# Patient Record
Sex: MALE | Race: WHITE | NOT HISPANIC OR LATINO | URBAN - METROPOLITAN AREA
[De-identification: names, ages, dates, MRNs, and addresses within clinical notes are randomized per-mention and may not be internally consistent; named-entity substitution may affect disease eponyms.]

---

## 2023-11-24 ENCOUNTER — OFFICE VISIT (OUTPATIENT)
Dept: URGENT CARE | Facility: CLINIC | Age: 63
End: 2023-11-24
Payer: COMMERCIAL

## 2023-11-24 VITALS
SYSTOLIC BLOOD PRESSURE: 128 MMHG | TEMPERATURE: 98.2 F | RESPIRATION RATE: 18 BRPM | DIASTOLIC BLOOD PRESSURE: 76 MMHG | HEART RATE: 71 BPM | OXYGEN SATURATION: 97 %

## 2023-11-24 DIAGNOSIS — H65.193 ACUTE MEE (MIDDLE EAR EFFUSION), BILATERAL: ICD-10-CM

## 2023-11-24 DIAGNOSIS — R42 DIZZINESS: ICD-10-CM

## 2023-11-24 DIAGNOSIS — R42 VERTIGO: Primary | ICD-10-CM

## 2023-11-24 DIAGNOSIS — K02.9 TOOTH DECAY: ICD-10-CM

## 2023-11-24 DIAGNOSIS — Z75.8 DOES NOT HAVE PRIMARY CARE PROVIDER: ICD-10-CM

## 2023-11-24 PROCEDURE — 99213 OFFICE O/P EST LOW 20 MIN: CPT | Performed by: FAMILY MEDICINE

## 2023-11-24 RX ORDER — MECLIZINE HYDROCHLORIDE 25 MG/1
25 TABLET ORAL 3 TIMES DAILY PRN
Qty: 30 TABLET | Refills: 0 | Status: SHIPPED | OUTPATIENT
Start: 2023-11-24

## 2023-11-24 RX ORDER — FLUTICASONE PROPIONATE 50 MCG
2 SPRAY, SUSPENSION (ML) NASAL DAILY
Qty: 9 ML | Refills: 0 | Status: SHIPPED | OUTPATIENT
Start: 2023-11-24

## 2023-11-24 RX ORDER — PREDNISONE 10 MG/1
TABLET ORAL
Qty: 24 TABLET | Refills: 0 | Status: SHIPPED | OUTPATIENT
Start: 2023-11-24

## 2023-11-24 NOTE — PROGRESS NOTES
[facility]        NAME: Jolanta Armendariz is a 59 y.o. male  : 1960    MRN: [id number]  DATE: 2023  TIME: 12:47 PM    Assessment and Plan   Vertigo [R42]  1. Vertigo  meclizine (ANTIVERT) 25 mg tablet      2. Acute MAIKOL (middle ear effusion), bilateral  predniSONE 10 mg tablet    fluticasone (FLONASE) 50 mcg/act nasal spray    Ambulatory Referral to [facility]      3. Dizziness  predniSONE 10 mg tablet    fluticasone (FLONASE) 50 mcg/act nasal spray    Ambulatory Referral to Family Practice    meclizine (ANTIVERT) 25 mg tablet      4. Tooth decay  Ambulatory Referral to Dentistry    Ambulatory Referral to [facility]      5. Does not have primary care provider  Ambulatory Referral to [facility]            Patient Instructions       Follow up with PCP in 3-5 days. Proceed to  ER if symptoms worsen. You have fluid in both of your ears - this is most likley why you are dizzy and feel like your ears are full. You have been prescribed prednisone which will help the inflammation. You are to use the nasal spray which will also help the congestion and inflammation  You are to call the dentist for the decayed tooth  You have been given a dental referral for this. Call a family provider for follow up health care - you have been given a referral for this as well   Go to the ED if symptoms worsen       Chief Complaint     Chief Complaint   Patient presents with    Ear Fullness     C/o "blocked ears to both sides" onset "two weeks", pt denies any other symptoms. Denies any recent cold symptoms. Pt reports "I've been putting Debrox drops in but its not helping". Pt reports intermittent dizziness when changing position, "because my ears are blocked". Denies PCP contact, states "I don't have one". History of Present Illness       This is a 59year old male who states that for the last 2 weeks has had ear fullness  x 2 weeks.  He was concerned he may have had ear wax so he has been using debrox. He states that it isn't helping with the dizziness. He states that he has noted some vomiting with the dizziness as well. He denies having a PCP. States dizziness also seems to be worse with changing positions. He denies recent cold symptoms. States he has a broken decayed tooth as well and has no dentist.           Review of Systems   Review of Systems   Constitutional: Negative. HENT:  Positive for congestion, dental problem and ear pain. Eyes:  Positive for discharge. Respiratory: Negative. Cardiovascular: Negative. Gastrointestinal:  Positive for nausea and vomiting. Endocrine: Negative. Genitourinary: Negative. Musculoskeletal: Negative. Skin: Negative. Allergic/Immunologic: Negative. Neurological:  Positive for dizziness. Hematological: Negative. Psychiatric/Behavioral: Negative. Current Medications       Current Outpatient Medications:     fluticasone (FLONASE) 50 mcg/act nasal spray, 2 sprays into each nostril daily, Disp: 9 mL, Rfl: 0    meclizine (ANTIVERT) 25 mg tablet, Take 1 tablet (25 mg total) by mouth 3 (three) times a day as needed for dizziness or nausea, Disp: 30 tablet, Rfl: 0    predniSONE 10 mg tablet, Take 5 tabs po x 2 days; 4 tabs po x 2 days; 3 tabs po x 1 day; 2 tabs po x 1 day. 1 tab po x 1 day., Disp: 24 tablet, Rfl: 0    Current Allergies     Allergies as of 11/24/2023    (No Known Allergies)            The following portions of the patient's history were reviewed and updated as appropriate: allergies, current medications, past family history, past medical history, past social history, past surgical history and problem list.     History reviewed. No pertinent past medical history. History reviewed. No pertinent surgical history. History reviewed. No pertinent family history. Medications have been verified.         Objective   /76 (BP Location: Right arm, Patient Position: Sitting)   Pulse 71   Temp 98.2 °F (36.8 °C) (Temporal)   Resp 18   SpO2 97%   No LMP for male patient. Physical Exam     Physical Exam  Vitals and nursing note reviewed. Constitutional:       General: He is not in acute distress. Appearance: Normal appearance. He is obese. He is not ill-appearing, toxic-appearing or diaphoretic. HENT:      Head: Normocephalic and atraumatic. Ears:      Comments: B/L TM with fluid. No redness      Nose: Congestion present. No rhinorrhea. Mouth/Throat:      Mouth: Mucous membranes are moist.      Pharynx: Oropharynx is clear. No oropharyngeal exudate or posterior oropharyngeal erythema. Eyes:      General: Lids are normal. Vision grossly intact. Gaze aligned appropriately. Extraocular Movements: Extraocular movements intact. Right eye: Normal extraocular motion and no nystagmus. Left eye: Normal extraocular motion and no nystagmus. Pupils: Pupils are equal, round, and reactive to light. Cardiovascular:      Rate and Rhythm: Normal rate and regular rhythm. Pulses: Normal pulses. Heart sounds: Normal heart sounds. Pulmonary:      Effort: Pulmonary effort is normal. No respiratory distress. Breath sounds: Normal breath sounds. No stridor. No wheezing, rhonchi or rales. Chest:      Chest wall: No tenderness. Musculoskeletal:         General: Normal range of motion. Cervical back: Normal range of motion and neck supple. Skin:     General: Skin is warm and dry. Capillary Refill: Capillary refill takes less than 2 seconds. Neurological:      General: No focal deficit present. Mental Status: He is alert and oriented to person, place, and time. GCS: GCS eye subscore is 4. GCS verbal subscore is 5. GCS motor subscore is 6. Cranial Nerves: Cranial nerves 2-12 are intact. Sensory: Sensation is intact. Motor: Motor function is intact. Coordination: Coordination is intact. Gait: Gait is intact. Psychiatric:         Mood and Affect: Mood normal.         Behavior: Behavior normal.         Thought Content:  Thought content normal.         Judgment: Judgment normal.

## 2023-11-24 NOTE — PATIENT INSTRUCTIONS
You have fluid in both of your ears - this is most likley why you are dizzy and feel like your ears are full. You have been prescribed prednisone which will help the inflammation. You are to use the nasal spray which will also help the congestion and inflammation  You are to call the dentist for the decayed tooth  You have been given a dental referral for this.   Call a family provider for follow up health care - you have been given a referral for this as well   Go to the ED if symptoms worsen

## 2024-03-20 ENCOUNTER — OFFICE VISIT (OUTPATIENT)
Dept: FAMILY MEDICINE CLINIC | Facility: CLINIC | Age: 64
End: 2024-03-20
Payer: COMMERCIAL

## 2024-03-20 VITALS
OXYGEN SATURATION: 95 % | BODY MASS INDEX: 39.17 KG/M2 | SYSTOLIC BLOOD PRESSURE: 128 MMHG | TEMPERATURE: 98.4 F | HEART RATE: 71 BPM | HEIGHT: 75 IN | DIASTOLIC BLOOD PRESSURE: 86 MMHG | WEIGHT: 315 LBS

## 2024-03-20 DIAGNOSIS — J30.89 ENVIRONMENTAL AND SEASONAL ALLERGIES: ICD-10-CM

## 2024-03-20 DIAGNOSIS — R42 DIZZINESS: ICD-10-CM

## 2024-03-20 DIAGNOSIS — R09.82 PND (POST-NASAL DRIP): ICD-10-CM

## 2024-03-20 DIAGNOSIS — Z23 ENCOUNTER FOR IMMUNIZATION: ICD-10-CM

## 2024-03-20 DIAGNOSIS — Z12.11 SCREENING FOR COLORECTAL CANCER: ICD-10-CM

## 2024-03-20 DIAGNOSIS — Z13.31 DEPRESSION SCREENING NEGATIVE: ICD-10-CM

## 2024-03-20 DIAGNOSIS — Z11.59 NEED FOR HEPATITIS C SCREENING TEST: ICD-10-CM

## 2024-03-20 DIAGNOSIS — Z12.5 SCREENING FOR MALIGNANT NEOPLASM OF PROSTATE: ICD-10-CM

## 2024-03-20 DIAGNOSIS — H65.193 ACUTE MEE (MIDDLE EAR EFFUSION), BILATERAL: ICD-10-CM

## 2024-03-20 DIAGNOSIS — Z13.29 SCREENING FOR THYROID DISORDER: ICD-10-CM

## 2024-03-20 DIAGNOSIS — Z01.10 ENCOUNTER FOR HEARING EXAMINATION, UNSPECIFIED WHETHER ABNORMAL FINDINGS: ICD-10-CM

## 2024-03-20 DIAGNOSIS — Z13.0 SCREENING FOR DEFICIENCY ANEMIA: ICD-10-CM

## 2024-03-20 DIAGNOSIS — Z12.12 SCREENING FOR COLORECTAL CANCER: ICD-10-CM

## 2024-03-20 DIAGNOSIS — Z13.220 SCREENING FOR LIPID DISORDERS: ICD-10-CM

## 2024-03-20 DIAGNOSIS — Z76.89 ENCOUNTER TO ESTABLISH CARE: Primary | ICD-10-CM

## 2024-03-20 DIAGNOSIS — Z13.1 SCREENING FOR DIABETES MELLITUS: ICD-10-CM

## 2024-03-20 DIAGNOSIS — E66.01 CLASS 3 SEVERE OBESITY WITHOUT SERIOUS COMORBIDITY WITH BODY MASS INDEX (BMI) OF 40.0 TO 44.9 IN ADULT, UNSPECIFIED OBESITY TYPE (HCC): ICD-10-CM

## 2024-03-20 PROBLEM — E66.813 CLASS 3 SEVERE OBESITY WITHOUT SERIOUS COMORBIDITY WITH BODY MASS INDEX (BMI) OF 40.0 TO 44.9 IN ADULT (HCC): Status: ACTIVE | Noted: 2024-03-20

## 2024-03-20 PROCEDURE — 99204 OFFICE O/P NEW MOD 45 MIN: CPT | Performed by: NURSE PRACTITIONER

## 2024-03-20 RX ORDER — FLUTICASONE PROPIONATE 50 MCG
2 SPRAY, SUSPENSION (ML) NASAL DAILY
Qty: 9 ML | Refills: 0 | Status: SHIPPED | OUTPATIENT
Start: 2024-03-20

## 2024-03-20 NOTE — PROGRESS NOTES
Assessment/Plan:    Problem List Items Addressed This Visit     Class 3 severe obesity without serious comorbidity with body mass index (BMI) of 40.0 to 44.9 in adult (HCC)   Other Visit Diagnoses     Encounter to establish care    -  Primary    Encounter for immunization        Screening for thyroid disorder        Relevant Orders    TSH, 3rd generation with Free T4 reflex    Screening for deficiency anemia        Relevant Orders    CBC and differential    Screening for diabetes mellitus        Relevant Orders    Comprehensive metabolic panel    Screening for lipid disorders        Relevant Orders    Lipid Panel with Direct LDL reflex    Screening for malignant neoplasm of prostate        Relevant Orders    PSA, Total Screen    Depression screening negative        Need for hepatitis C screening test        Relevant Orders    Hepatitis C Antibody    Screening for colorectal cancer        Relevant Orders    Cologuard    Environmental and seasonal allergies        Relevant Orders    [facility] Allergy Panel, Adult    Encounter for hearing examination, unspecified whether abnormal findings        Relevant Orders    Ambulatory Referral to Audiology    Acute MAIKOL (middle ear effusion), bilateral        Relevant Medications    fluticasone (FLONASE) 50 mcg/act nasal spray    Dizziness        Relevant Medications    fluticasone (FLONASE) 50 mcg/act nasal spray    PND (post-nasal drip)        Relevant Medications    Misc Natural Product Nasal (Nasal Cleanse Rinse Mix) PACK           Diagnoses and all orders for this visit:    Encounter to establish care    Encounter for immunization    Screening for thyroid disorder  -     TSH, 3rd generation with Free T4 reflex; Future    Screening for deficiency anemia  -     CBC and differential; Future    Screening for diabetes mellitus  -     Comprehensive metabolic panel; Future    Screening for lipid disorders  -     Lipid Panel with Direct LDL reflex; Future    Screening for malignant  neoplasm of prostate  -     PSA, Total Screen; Future    Class 3 severe obesity without serious comorbidity with body mass index (BMI) of 40.0 to 44.9 in adult, unspecified obesity type (HCC)    Depression screening negative    Need for hepatitis C screening test  -     Hepatitis C Antibody; Future    Screening for colorectal cancer  -     Cologuard    Environmental and seasonal allergies  -     [facility] Allergy Panel, Adult; Future    Encounter for hearing examination, unspecified whether abnormal findings  -     Ambulatory Referral to Audiology; Future    Acute MAIKOL (middle ear effusion), bilateral  -     fluticasone (FLONASE) 50 mcg/act nasal spray; 2 sprays into each nostril daily    Dizziness  -     fluticasone (FLONASE) 50 mcg/act nasal spray; 2 sprays into each nostril daily    PND (post-nasal drip)  -     Misc Natural Product Nasal (Nasal Cleanse Rinse Mix) PACK; 1 Container into each nostril 3 (three) times a day as needed (nasal congestion)        No problem-specific Assessment & Plan notes found for this encounter.        Subjective:      Patient ID: Minor Pearson is a 63 y.o. male.    Patient presents with:  Establish Care: Pt states he is been having issues with both ears, his right ear is worse; he can barely hear anything. He wants to do the cologuard.     NP with no significant PMHx presents to establish care.     Pt seen in urgent care and November 2023 for upper respiratory and ear symptoms.  Patient was diagnosed with otitis media effusion bilaterally with vertigo and was treated conservatively with steroids and Antivert.  Patient states that majority of his symptoms and vertigo have improved however he still feels as though he continues to be unable to hear from the right ear.  States last audiology examination was in his second decade of life.  On exam bilateral canals and TMs are unremarkable.  OP exam showed scant white postnasal drip recommending nasal rinse kit and renew Flonase.  Patient  "is due for routine serology and will follow-up in 3 to 4 weeks for AWE.         The following portions of the patient's history were reviewed and updated as appropriate:   He has no past medical history on file.,  does not have any pertinent problems on file.,   has no past surgical history on file.,  family history includes Alzheimer's disease in his mother; Diabetes in his father.,   reports that he has never smoked. He has never used smokeless tobacco. He reports current alcohol use of about 2.0 standard drinks of alcohol per week. He reports that he does not currently use drugs.,  has No Known Allergies..  Current Outpatient Medications   Medication Sig Dispense Refill   • fluticasone (FLONASE) 50 mcg/act nasal spray 2 sprays into each nostril daily 9 mL 0   • Misc Natural Product Nasal (Nasal Cleanse Rinse Mix) PACK 1 Container into each nostril 3 (three) times a day as needed (nasal congestion) 1 each 0   • meclizine (ANTIVERT) 25 mg tablet Take 1 tablet (25 mg total) by mouth 3 (three) times a day as needed for dizziness or nausea (Patient not taking: Reported on 3/20/2024) 30 tablet 0   • predniSONE 10 mg tablet Take 5 tabs po x 2 days; 4 tabs po x 2 days; 3 tabs po x 1 day; 2 tabs po x 1 day. 1 tab po x 1 day. (Patient not taking: Reported on 3/20/2024) 24 tablet 0     No current facility-administered medications for this visit.       Depression Screening and Follow-up Plan: Patient was screened for depression during today's encounter. They screened negative with a PHQ-2 score of 0.          Review of Systems   All other systems reviewed and are negative.        Objective:  Vitals:    03/20/24 0803   BP: 128/86   Pulse: 71   Temp: 98.4 °F (36.9 °C)   TempSrc: Tympanic   SpO2: 95%   Weight: (!) 144 kg (317 lb 3.2 oz)   Height: 6' 2.5\" (1.892 m)     Body mass index is 40.18 kg/m².     Physical Exam  Vitals and nursing note reviewed.   Constitutional:       Appearance: Normal appearance. He is well-developed. "   HENT:      Head: Normocephalic and atraumatic.      Right Ear: Tympanic membrane, ear canal and external ear normal.      Left Ear: Tympanic membrane, ear canal and external ear normal.      Nose: Nose normal.      Right Sinus: No maxillary sinus tenderness or frontal sinus tenderness.      Left Sinus: No maxillary sinus tenderness or frontal sinus tenderness.      Mouth/Throat:      Mouth: Mucous membranes are moist.      Pharynx: Uvula midline.     Eyes:      General: Lids are normal.      Conjunctiva/sclera: Conjunctivae normal.      Pupils: Pupils are equal, round, and reactive to light.   Neck:      Thyroid: No thyroid mass.      Vascular: No JVD.      Trachea: Trachea and phonation normal.   Cardiovascular:      Rate and Rhythm: Normal rate and regular rhythm.      Pulses: Normal pulses.      Heart sounds: Normal heart sounds, S1 normal and S2 normal. No murmur heard.     No friction rub. No gallop.   Pulmonary:      Effort: Pulmonary effort is normal.      Breath sounds: Normal breath sounds.   Abdominal:      General: Bowel sounds are normal.      Palpations: Abdomen is soft.      Tenderness: There is no abdominal tenderness.   Genitourinary:     Comments: Deferred  Musculoskeletal:         General: Normal range of motion.      Cervical back: Full passive range of motion without pain, normal range of motion and neck supple.      Right lower leg: No edema.      Left lower leg: No edema.   Lymphadenopathy:      Head:      Right side of head: No submental, submandibular, tonsillar, preauricular, posterior auricular or occipital adenopathy.      Left side of head: No submental, submandibular, tonsillar, preauricular, posterior auricular or occipital adenopathy.      Cervical: No cervical adenopathy.   Skin:     General: Skin is warm and dry.      Capillary Refill: Capillary refill takes less than 2 seconds.   Neurological:      General: No focal deficit present.      Mental Status: He is alert and oriented to  "person, place, and time.      Sensory: Sensation is intact.      Motor: Motor function is intact.      Coordination: Coordination is intact.      Gait: Gait is intact.   Psychiatric:         Attention and Perception: Attention and perception normal.         Mood and Affect: Mood and affect normal.         Speech: Speech normal.         Behavior: Behavior normal. Behavior is cooperative.         Thought Content: Thought content normal.         Cognition and Memory: Cognition normal.         Judgment: Judgment normal.           Portions of the record may have been created with voice recognition software. Occasional wrong word or \"sound a like\" substitutions may have occurred due to the inherent limitations of voice recognition software. Read the chart carefully and recognize, using context, where substitutions have occurred. Contact me with any questions.  "

## 2024-03-21 ENCOUNTER — TELEPHONE (OUTPATIENT)
Dept: FAMILY MEDICINE CLINIC | Facility: CLINIC | Age: 64
End: 2024-03-21

## 2024-03-21 ENCOUNTER — APPOINTMENT (OUTPATIENT)
Dept: LAB | Facility: CLINIC | Age: 64
End: 2024-03-21
Payer: COMMERCIAL

## 2024-03-21 DIAGNOSIS — Z11.59 NEED FOR HEPATITIS C SCREENING TEST: ICD-10-CM

## 2024-03-21 DIAGNOSIS — Z13.29 SCREENING FOR THYROID DISORDER: ICD-10-CM

## 2024-03-21 DIAGNOSIS — Z13.0 SCREENING FOR DEFICIENCY ANEMIA: ICD-10-CM

## 2024-03-21 DIAGNOSIS — Z13.1 SCREENING FOR DIABETES MELLITUS: ICD-10-CM

## 2024-03-21 DIAGNOSIS — Z12.5 SCREENING FOR MALIGNANT NEOPLASM OF PROSTATE: ICD-10-CM

## 2024-03-21 DIAGNOSIS — J30.89 ENVIRONMENTAL AND SEASONAL ALLERGIES: ICD-10-CM

## 2024-03-21 DIAGNOSIS — Z13.220 SCREENING FOR LIPID DISORDERS: ICD-10-CM

## 2024-03-21 LAB
ALBUMIN SERPL BCP-MCNC: 4.5 G/DL (ref 3.5–5)
ALP SERPL-CCNC: 122 U/L (ref 34–104)
ALT SERPL W P-5'-P-CCNC: 128 U/L (ref 7–52)
ANION GAP SERPL CALCULATED.3IONS-SCNC: 12 MMOL/L (ref 4–13)
AST SERPL W P-5'-P-CCNC: 61 U/L (ref 13–39)
BASOPHILS # BLD AUTO: 0.04 THOUSANDS/ÂΜL (ref 0–0.1)
BASOPHILS NFR BLD AUTO: 1 % (ref 0–1)
BILIRUB SERPL-MCNC: 0.82 MG/DL (ref 0.2–1)
BUN SERPL-MCNC: 11 MG/DL (ref 5–25)
CALCIUM SERPL-MCNC: 9.4 MG/DL (ref 8.4–10.2)
CHLORIDE SERPL-SCNC: 100 MMOL/L (ref 96–108)
CHOLEST SERPL-MCNC: 167 MG/DL
CO2 SERPL-SCNC: 28 MMOL/L (ref 21–32)
CREAT SERPL-MCNC: 0.78 MG/DL (ref 0.6–1.3)
EOSINOPHIL # BLD AUTO: 0.24 THOUSAND/ÂΜL (ref 0–0.61)
EOSINOPHIL NFR BLD AUTO: 3 % (ref 0–6)
ERYTHROCYTE [DISTWIDTH] IN BLOOD BY AUTOMATED COUNT: 12.4 % (ref 11.6–15.1)
GFR SERPL CREATININE-BSD FRML MDRD: 96 ML/MIN/1.73SQ M
GLUCOSE P FAST SERPL-MCNC: 82 MG/DL (ref 65–99)
HCT VFR BLD AUTO: 53.6 % (ref 36.5–49.3)
HCV AB SER QL: NORMAL
HDLC SERPL-MCNC: 55 MG/DL
HGB BLD-MCNC: 17.6 G/DL (ref 12–17)
IMM GRANULOCYTES # BLD AUTO: 0.05 THOUSAND/UL (ref 0–0.2)
IMM GRANULOCYTES NFR BLD AUTO: 1 % (ref 0–2)
LDLC SERPL CALC-MCNC: 97 MG/DL (ref 0–100)
LYMPHOCYTES # BLD AUTO: 2.02 THOUSANDS/ÂΜL (ref 0.6–4.47)
LYMPHOCYTES NFR BLD AUTO: 26 % (ref 14–44)
MCH RBC QN AUTO: 29.9 PG (ref 26.8–34.3)
MCHC RBC AUTO-ENTMCNC: 32.8 G/DL (ref 31.4–37.4)
MCV RBC AUTO: 91 FL (ref 82–98)
MONOCYTES # BLD AUTO: 0.74 THOUSAND/ÂΜL (ref 0.17–1.22)
MONOCYTES NFR BLD AUTO: 10 % (ref 4–12)
NEUTROPHILS # BLD AUTO: 4.67 THOUSANDS/ÂΜL (ref 1.85–7.62)
NEUTS SEG NFR BLD AUTO: 59 % (ref 43–75)
NRBC BLD AUTO-RTO: 0 /100 WBCS
PLATELET # BLD AUTO: 223 THOUSANDS/UL (ref 149–390)
PMV BLD AUTO: 10.9 FL (ref 8.9–12.7)
POTASSIUM SERPL-SCNC: 4.4 MMOL/L (ref 3.5–5.3)
PROT SERPL-MCNC: 7.6 G/DL (ref 6.4–8.4)
PSA SERPL-MCNC: 0.72 NG/ML (ref 0–4)
RBC # BLD AUTO: 5.88 MILLION/UL (ref 3.88–5.62)
SODIUM SERPL-SCNC: 140 MMOL/L (ref 135–147)
TRIGL SERPL-MCNC: 74 MG/DL
TSH SERPL DL<=0.05 MIU/L-ACNC: 1.42 UIU/ML (ref 0.45–4.5)
WBC # BLD AUTO: 7.76 THOUSAND/UL (ref 4.31–10.16)

## 2024-03-21 PROCEDURE — 85025 COMPLETE CBC W/AUTO DIFF WBC: CPT

## 2024-03-21 PROCEDURE — 80053 COMPREHEN METABOLIC PANEL: CPT

## 2024-03-21 PROCEDURE — 82785 ASSAY OF IGE: CPT

## 2024-03-21 PROCEDURE — 86003 ALLG SPEC IGE CRUDE XTRC EA: CPT

## 2024-03-21 PROCEDURE — 86803 HEPATITIS C AB TEST: CPT

## 2024-03-21 PROCEDURE — 80061 LIPID PANEL: CPT

## 2024-03-21 PROCEDURE — 36415 COLL VENOUS BLD VENIPUNCTURE: CPT

## 2024-03-21 PROCEDURE — G0103 PSA SCREENING: HCPCS

## 2024-03-21 PROCEDURE — 84443 ASSAY THYROID STIM HORMONE: CPT

## 2024-03-21 NOTE — TELEPHONE ENCOUNTER
He wanted to know if you could add lyme test to his BW that he had done today. Jonathan took an extra vial of blood just in case. He has had ticks on him and lives in the [place] he just wanted to be tested

## 2024-03-23 LAB
A ALTERNATA IGE QN: <0.1 KUA/I
A FUMIGATUS IGE QN: <0.1 KUA/I
BERMUDA GRASS IGE QN: 0.13 KUA/I
BOXELDER IGE QN: <0.1 KUA/I
C HERBARUM IGE QN: <0.1 KUA/I
CAT DANDER IGE QN: 0.1 KUA/I
CMN PIGWEED IGE QN: <0.1 KUA/I
COMMON RAGWEED IGE QN: <0.1 KUA/I
COTTONWOOD IGE QN: <0.1 KUA/I
D FARINAE IGE QN: 0.14 KUA/I
D PTERONYSS IGE QN: 0.23 KUA/I
DOG DANDER IGE QN: 0.17 KUA/I
LONDON PLANE IGE QN: <0.1 KUA/I
MOUSE URINE PROT IGE QN: <0.1 KUA/I
MT JUNIPER IGE QN: <0.1 KUA/I
MUGWORT IGE QN: <0.1 KUA/I
P NOTATUM IGE QN: <0.1 KUA/I
ROACH IGE QN: 1.37 KUA/I
SHEEP SORREL IGE QN: <0.1 KUA/I
SILVER BIRCH IGE QN: <0.1 KUA/I
TIMOTHY IGE QN: 0.48 KUA/I
TOTAL IGE SMQN RAST: 116 KU/L (ref 0–113)
WALNUT IGE QN: <0.1 KUA/I
WHITE ASH IGE QN: <0.1 KUA/I
WHITE ELM IGE QN: <0.1 KUA/I
WHITE MULBERRY IGE QN: <0.1 KUA/I
WHITE OAK IGE QN: <0.1 KUA/I

## 2024-03-28 DIAGNOSIS — E66.01 CLASS 3 SEVERE OBESITY WITHOUT SERIOUS COMORBIDITY WITH BODY MASS INDEX (BMI) OF 40.0 TO 44.9 IN ADULT, UNSPECIFIED OBESITY TYPE (HCC): Primary | ICD-10-CM

## 2024-04-13 LAB — COLOGUARD RESULT REPORTABLE: POSITIVE

## 2024-04-15 ENCOUNTER — TELEPHONE (OUTPATIENT)
Age: 64
End: 2024-04-15

## 2024-04-15 ENCOUNTER — PREP FOR PROCEDURE (OUTPATIENT)
Age: 64
End: 2024-04-15

## 2024-04-15 DIAGNOSIS — Z12.11 SCREENING FOR COLON CANCER: Primary | ICD-10-CM

## 2024-04-15 DIAGNOSIS — R19.5 POSITIVE COLORECTAL CANCER SCREENING USING COLOGUARD TEST: Primary | ICD-10-CM

## 2024-04-15 NOTE — TELEPHONE ENCOUNTER
Scheduled date of colonoscopy (as of today):5/14/2024  Physician performing colonoscopy:   Location of colonoscopy: [facility]  Bowel prep reviewed with patient: Alondra Bowser  Instructions reviewed with patient by: Alondra Bowser  Clearances: N/A    Miralax Dulcolax prep sent via [employer]

## 2024-04-15 NOTE — TELEPHONE ENCOUNTER
04/15/24  Screened by: Alondra Bowser    Referring Provider ABHI Rowan    Pre- Screening:     There is no height or weight on file to calculate BMI.  Has patient been referred for a routine screening Colonoscopy? yes  Is the patient between 45-75 years old? yes      Previous Colonoscopy no   If yes:    Date: N/A    Facility: N/A    Reason: N/A        Does the patient want to see a Gastroenterologist prior to their procedure OR are they having any GI symptoms? no    Has the patient been hospitalized or had abdominal surgery in the past 6 months? no    Does the patient use supplemental oxygen? no    Does the patient take Coumadin, Lovenox, Plavix, Elliquis, Xarelto, or other blood thinning medication? no    Has the patient had a stroke, cardiac event, or stent placed in the past year? no      If patient is between 45yrs - 49yrs, please advise patient that we will have to confirm benefits & coverage with their insurance company for a routine screening colonoscopy.

## 2024-04-16 NOTE — TELEPHONE ENCOUNTER
Scheduled date of colonoscopy (as of today): 5-   Physician performing colonoscopy: Dr. Sandra  Location of colonoscopy: [facility]   Bowel prep reviewed with patient: sent via [employer]   Instructions reviewed with patient by: SRINI  Clearances: N/A

## 2024-04-22 ENCOUNTER — OFFICE VISIT (OUTPATIENT)
Dept: FAMILY MEDICINE CLINIC | Facility: CLINIC | Age: 64
End: 2024-04-22
Payer: COMMERCIAL

## 2024-04-22 VITALS
DIASTOLIC BLOOD PRESSURE: 96 MMHG | HEIGHT: 75 IN | SYSTOLIC BLOOD PRESSURE: 130 MMHG | TEMPERATURE: 97.6 F | OXYGEN SATURATION: 97 % | HEART RATE: 83 BPM | WEIGHT: 315 LBS | BODY MASS INDEX: 39.17 KG/M2

## 2024-04-22 DIAGNOSIS — Z23 ENCOUNTER FOR IMMUNIZATION: Primary | ICD-10-CM

## 2024-04-22 DIAGNOSIS — J30.89 ENVIRONMENTAL AND SEASONAL ALLERGIES: ICD-10-CM

## 2024-04-22 DIAGNOSIS — E66.01 CLASS 3 SEVERE OBESITY DUE TO EXCESS CALORIES WITHOUT SERIOUS COMORBIDITY WITH BODY MASS INDEX (BMI) OF 40.0 TO 44.9 IN ADULT (HCC): ICD-10-CM

## 2024-04-22 PROCEDURE — 99396 PREV VISIT EST AGE 40-64: CPT | Performed by: NURSE PRACTITIONER

## 2024-04-22 RX ORDER — FLUTICASONE PROPIONATE 50 MCG
1 SPRAY, SUSPENSION (ML) NASAL DAILY
Qty: 16 G | Refills: 3 | Status: SHIPPED | OUTPATIENT
Start: 2024-04-22

## 2024-04-22 NOTE — PROGRESS NOTES
ADULT ANNUAL PHYSICAL  [facility] CARE    NAME: Minor Pearson  AGE: 64 y.o. SEX: male  : 1960     DATE: 2024     Assessment and Plan:     Problem List Items Addressed This Visit     Class 3 severe obesity without serious comorbidity with body mass index (BMI) of 40.0 to 44.9 in adult (HCC)   Other Visit Diagnoses     Encounter for immunization    -  Primary    Environmental and seasonal allergies        Relevant Medications    fluticasone (FLONASE) 50 mcg/act nasal spray          Immunizations and preventive care screenings were discussed with patient today. Appropriate education was printed on patient's after visit summary.      Counseling:  Alcohol/drug use: discussed moderation in alcohol intake, the recommendations for healthy alcohol use, and avoidance of illicit drug use.  Dental Health: discussed importance of regular tooth brushing, flossing, and dental visits.  Injury prevention: discussed safety/seat belts, safety helmets, smoke detectors, carbon dioxide detectors, and smoking near bedding or upholstery.  Sexual health: discussed sexually transmitted diseases, partner selection, use of condoms, avoidance of unintended pregnancy, and contraceptive alternatives.  Exercise: the importance of regular exercise/physical activity was discussed. Recommend exercise 3-5 times per week for at least 30 minutes.       Depression Screening and Follow-up Plan: Patient was screened for depression during today's encounter. They screened negative with a PHQ-2 score of 0.        Return in about 6 months (around 10/22/2024).     Chief Complaint:     Chief Complaint   Patient presents with   • Annual Exam      History of Present Illness:     Adult Annual Physical   Patient here for a comprehensive physical exam. The patient reports no problems.    Diet and Physical Activity  Diet/Nutrition: limited fruits/vegetables and consumes beer on the regular .    Exercise: no formal exercise.      Depression Screening  PHQ-2/9 Depression Screening    Little interest or pleasure in doing things: 0 - not at all  Feeling down, depressed, or hopeless: 0 - not at all  Trouble falling or staying asleep, or sleeping too much: 0 - not at all  Feeling tired or having little energy: 0 - not at all  Poor appetite or overeatin - not at all  Feeling bad about yourself - or that you are a failure or have let yourself or your family down: 0 - not at all  Trouble concentrating on things, such as reading the newspaper or watching television: 0 - not at all  Moving or speaking so slowly that other people could have noticed. Or the opposite - being so fidgety or restless that you have been moving around a lot more than usual: 0 - not at all  Thoughts that you would be better off dead, or of hurting yourself in some way: 0 - not at all  PHQ-2 Score: 0  PHQ-2 Interpretation: Negative depression screen  PHQ-9 Score: 0  PHQ-9 Interpretation: No or Minimal depression       General Health  Sleep: sleeps well and gets 7-8 hours of sleep on average.   Hearing: normal - bilateral.  Vision: vision problems: reports eye HCP ordered corrective lenses and most recent eye exam <1 year ago.   Dental: no dental visits for >1 year.        Health  Symptoms include: none    Advanced Care Planning  Do you have an advanced directive? no  Do you have a durable medical power of ? no  ACP document given to patient? yes     Review of Systems:     Review of Systems   All other systems reviewed and are negative.     Past Medical History:     History reviewed. No pertinent past medical history.   Past Surgical History:     History reviewed. No pertinent surgical history.   Family History:     Family History   Problem Relation Age of Onset   • Alzheimer's disease Mother    • Diabetes Father       Social History:     Social History     Socioeconomic History   • Marital status: /Civil Union     Spouse  "name: None   • Number of children: None   • Years of education: None   • Highest education level: None   Occupational History   • None   Tobacco Use   • Smoking status: Never   • Smokeless tobacco: Never   Vaping Use   • Vaping status: Never Used   Substance and Sexual Activity   • Alcohol use: Yes     Alcohol/week: 2.0 standard drinks of alcohol     Types: 2 Cans of beer per week     Comment: daily   • Drug use: Not Currently   • Sexual activity: None   Other Topics Concern   • None   Social History Narrative   • None     Social Determinants of Health     Financial Resource Strain: Not on file   Food Insecurity: Not on file   Transportation Needs: Not on file   Physical Activity: Not on file   Stress: Not on file   Social Connections: Not on file   Intimate Partner Violence: Not on file   Housing Stability: Not on file      Current Medications:     Current Outpatient Medications   Medication Sig Dispense Refill   • fluticasone (FLONASE) 50 mcg/act nasal spray 1 spray into each nostril daily 16 g 3     No current facility-administered medications for this visit.      Allergies:     No Known Allergies   Physical Exam:     /96   Pulse 83   Temp 97.6 °F (36.4 °C) (Tympanic)   Ht 6' 2.5\" (1.892 m)   Wt (!) 144 kg (316 lb 9.6 oz)   SpO2 97%   BMI 40.11 kg/m²     Physical Exam  Vitals and nursing note reviewed.   Constitutional:       Appearance: He is well-developed. He is obese.   HENT:      Head: Normocephalic and atraumatic.      Right Ear: External ear normal.      Left Ear: External ear normal.      Nose: Nose normal.   Eyes:      Conjunctiva/sclera: Conjunctivae normal.      Pupils: Pupils are equal, round, and reactive to light.   Cardiovascular:      Rate and Rhythm: Normal rate and regular rhythm.      Heart sounds: Normal heart sounds.   Pulmonary:      Effort: Pulmonary effort is normal.      Breath sounds: Normal breath sounds.   Abdominal:      General: Bowel sounds are normal.      Palpations: " Abdomen is soft.   Genitourinary:     Comments: Deferred  Musculoskeletal:         General: Normal range of motion.      Cervical back: Normal range of motion and neck supple.   Skin:     General: Skin is warm and dry.      Capillary Refill: Capillary refill takes less than 2 seconds.   Neurological:      Mental Status: He is alert and oriented to person, place, and time.   Psychiatric:         Behavior: Behavior normal.         Thought Content: Thought content normal.         Judgment: Judgment normal.            No visits with results within 1 Month(s) from this visit.   Latest known visit with results is:   Appointment on 03/21/2024   Component Date Value Ref Range Status   • Cholesterol 03/21/2024 167  See Comment mg/dL Final    Cholesterol:         Pediatric <18 Years        Desirable          <170 mg/dL      Borderline High    170-199 mg/dL      High               >=200 mg/dL        Adult >=18 Years            Desirable         <200 mg/dL      Borderline High   200-239 mg/dL      High              >239 mg/dL     • Triglycerides 03/21/2024 74  See Comment mg/dL Final    Triglyceride:     0-9Y            <75mg/dL     10Y-17Y         <90 mg/dL       >=18Y     Normal          <150 mg/dL     Borderline High 150-199 mg/dL     High            200-499 mg/dL        Very High       >499 mg/dL    Specimen collection should occur prior to Metamizole administration due to the potential for falsely depressed results.   • HDL, Direct 03/21/2024 55  >=40 mg/dL Final   • LDL Calculated 03/21/2024 97  0 - 100 mg/dL Final    LDL Cholesterol:     Optimal           <100 mg/dl     Near Optimal      100-129 mg/dl     Above Optimal       Borderline High 130-159 mg/dl       High            160-189 mg/dl       Very High       >189 mg/dl         This screening LDL is a calculated result.   It does not have the accuracy of the Direct Measured LDL in the monitoring of patients with hyperlipidemia and/or statin therapy.   Direct Measure  LDL ([id number]) must be ordered separately in these patients.   • WBC 03/21/2024 7.76  4.31 - 10.16 Thousand/uL Final   • RBC 03/21/2024 5.88 (H)  3.88 - 5.62 Million/uL Final   • Hemoglobin 03/21/2024 17.6 (H)  12.0 - 17.0 g/dL Final   • Hematocrit 03/21/2024 53.6 (H)  36.5 - 49.3 % Final   • MCV 03/21/2024 91  82 - 98 fL Final   • MCH 03/21/2024 29.9  26.8 - 34.3 pg Final   • MCHC 03/21/2024 32.8  31.4 - 37.4 g/dL Final   • RDW 03/21/2024 12.4  11.6 - 15.1 % Final   • MPV 03/21/2024 10.9  8.9 - 12.7 fL Final   • Platelets 03/21/2024 223  149 - 390 Thousands/uL Final   • nRBC 03/21/2024 0  /100 WBCs Final   • Segmented % 03/21/2024 59  43 - 75 % Final   • Immature Grans % 03/21/2024 1  0 - 2 % Final   • Lymphocytes % 03/21/2024 26  14 - 44 % Final   • Monocytes % 03/21/2024 10  4 - 12 % Final   • Eosinophils Relative 03/21/2024 3  0 - 6 % Final   • Basophils Relative 03/21/2024 1  0 - 1 % Final   • Absolute Neutrophils 03/21/2024 4.67  1.85 - 7.62 Thousands/µL Final   • Absolute Immature Grans 03/21/2024 0.05  0.00 - 0.20 Thousand/uL Final   • Absolute Lymphocytes 03/21/2024 2.02  0.60 - 4.47 Thousands/µL Final   • Absolute Monocytes 03/21/2024 0.74  0.17 - 1.22 Thousand/µL Final   • Eosinophils Absolute 03/21/2024 0.24  0.00 - 0.61 Thousand/µL Final   • Basophils Absolute 03/21/2024 0.04  0.00 - 0.10 Thousands/µL Final   • Sodium 03/21/2024 140  135 - 147 mmol/L Final   • Potassium 03/21/2024 4.4  3.5 - 5.3 mmol/L Final   • Chloride 03/21/2024 100  96 - 108 mmol/L Final   • CO2 03/21/2024 28  21 - 32 mmol/L Final   • ANION GAP 03/21/2024 12  4 - 13 mmol/L Final   • BUN 03/21/2024 11  5 - 25 mg/dL Final   • Creatinine 03/21/2024 0.78  0.60 - 1.30 mg/dL Final    Standardized to IDMS reference method   • Glucose, Fasting 03/21/2024 82  65 - 99 mg/dL Final   • Calcium 03/21/2024 9.4  8.4 - 10.2 mg/dL Final   • AST 03/21/2024 61 (H)  13 - 39 U/L Final   • ALT 03/21/2024 128 (H)  7 - 52 U/L Final    Specimen collection  should occur prior to Sulfasalazine administration due to the potential for falsely depressed results.    • Alkaline Phosphatase 03/21/2024 122 (H)  34 - 104 U/L Final   • Total Protein 03/21/2024 7.6  6.4 - 8.4 g/dL Final   • Albumin 03/21/2024 4.5  3.5 - 5.0 g/dL Final   • Total Bilirubin 03/21/2024 0.82  0.20 - 1.00 mg/dL Final    Use of this assay is not recommended for patients undergoing treatment with eltrombopag due to the potential for falsely elevated results.  N-acetyl-p-benzoquinone imine (metabolite of Acetaminophen) will generate erroneously low results in samples for patients that have taken an overdose of Acetaminophen.   • eGFR 03/21/2024 96  ml/min/1.73sq m Final   • TSH 3RD GENERATON 03/21/2024 1.416  0.450 - 4.500 uIU/mL Final    Adult TSH (3rd generation) reference range follows the recommended guidelines of the American Thyroid Association, January, 2020.   • PSA 03/21/2024 0.72  0.00 - 4.00 ng/mL Final    [employer] Access chemiluminescent immunoassay. Confirm baseline values for patients being serially monitored.    • Hepatitis C Ab 03/21/2024 Non-reactive  Non-Reactive Final   • A.ALTERNATA 03/21/2024 <0.10  0.00 - 0.09 kUA/I Final   • A.FUMIGATUS 03/21/2024 <0.10  0.00 - 0.09 kUA/I Final   • Bermuda Grass 03/21/2024 0.13 (H)  0.00 - 0.09 kUA/I Final   • Box Elder  03/21/2024 <0.10  0.00 - 0.09 kUA/I Final   • Cat Epithellium-Dander 03/21/2024 0.10 (H)  0.00 - 0.09 kUA/I Final   • C.HERBARUM 03/21/2024 <0.10  0.00 - 0.09 kUA/I Final   • Cockroach 03/21/2024 1.37 (H)  0.00 - 0.09 kUA/I Final   • Common Silver Birch 03/21/2024 <0.10  0.00 - 0.09 kUA/I Final   • Gibson 03/21/2024 <0.10  0.00 - 0.09 kUA/I Final   • D. farinae 03/21/2024 0.14 (H)  0.00 - 0.09 kUA/I Final   • D. pteronyssinus 03/21/2024 0.23 (H)  0.00 - 0.09 kUA/I Final   • Dog Dander 03/21/2024 0.17 (H)  0.00 - 0.09 kUA/I Final   • Elm IgE 03/21/2024 <0.10  0.00 - 0.09 kUA/I Final   • Mountain Whitewood Tree 03/21/2024 <0.10   0.00 - 0.09 kUA/I Final   • Mugwort 03/21/2024 <0.10  0.00 - 0.09 kUA/I Final   • [place]ake Tree 03/21/2024 <0.10  0.00 - 0.09 kUA/I Final   • Oak 03/21/2024 <0.10  0.00 - 0.09 kUA/I Final   • P.CHRYSOGENUM 03/21/2024 <0.10  0.00 - 0.09 kUA/I Final   • Rough Pigweed  IgE 03/21/2024 <0.10  0.00 - 0.09 kUA/I Final   • Common Ragweed 03/21/2024 <0.10  0.00 - 0.09 kUA/I Final   • Sheep Sorrel IgE 03/21/2024 <0.10  0.00 - 0.09 kUA/I Final   • Larsen Tree 03/21/2024 <0.10  0.00 - 0.09 kUA/I Final   • Pavan Grass 03/21/2024 0.48 (H)  0.00 - 0.09 kUA/I Final   • Pacific Palisades Tree 03/21/2024 <0.10  0.00 - 0.09 kUA/I Final   • White Guicho Tree 03/21/2024 <0.10  0.00 - 0.09 kUA/I Final   • IgE 03/21/2024 116 (H)  0 - 113 kU/l Final   • MOUSE URINE 03/21/2024 <0.10  0.00 - 0.09 kUA/I Final         I have reviewed all the lab results. There are some abnormalities that are not critical to the patient's health, I have discussed these in person at this office appointment.    ABHI Arvizu  [facility]

## 2024-05-16 RX ORDER — SODIUM CHLORIDE, SODIUM LACTATE, POTASSIUM CHLORIDE, CALCIUM CHLORIDE 600; 310; 30; 20 MG/100ML; MG/100ML; MG/100ML; MG/100ML
100 INJECTION, SOLUTION INTRAVENOUS CONTINUOUS
Status: CANCELLED | OUTPATIENT
Start: 2024-05-16

## 2024-05-17 ENCOUNTER — HOSPITAL ENCOUNTER (OUTPATIENT)
Dept: GASTROENTEROLOGY | Facility: HOSPITAL | Age: 64
Setting detail: OUTPATIENT SURGERY
End: 2024-05-17
Attending: INTERNAL MEDICINE
Payer: COMMERCIAL

## 2024-05-17 ENCOUNTER — ANESTHESIA (OUTPATIENT)
Dept: GASTROENTEROLOGY | Facility: HOSPITAL | Age: 64
End: 2024-05-17

## 2024-05-17 ENCOUNTER — ANESTHESIA EVENT (OUTPATIENT)
Dept: GASTROENTEROLOGY | Facility: HOSPITAL | Age: 64
End: 2024-05-17

## 2024-05-17 VITALS
HEART RATE: 75 BPM | OXYGEN SATURATION: 95 % | WEIGHT: 310.85 LBS | RESPIRATION RATE: 20 BRPM | SYSTOLIC BLOOD PRESSURE: 136 MMHG | BODY MASS INDEX: 39.89 KG/M2 | TEMPERATURE: 98.1 F | HEIGHT: 74 IN | DIASTOLIC BLOOD PRESSURE: 81 MMHG

## 2024-05-17 DIAGNOSIS — Z12.11 SCREENING FOR COLON CANCER: ICD-10-CM

## 2024-05-17 PROCEDURE — 88305 TISSUE EXAM BY PATHOLOGIST: CPT | Performed by: PATHOLOGY

## 2024-05-17 PROCEDURE — 45385 COLONOSCOPY W/LESION REMOVAL: CPT | Performed by: INTERNAL MEDICINE

## 2024-05-17 PROCEDURE — 45380 COLONOSCOPY AND BIOPSY: CPT | Performed by: INTERNAL MEDICINE

## 2024-05-17 RX ORDER — PROPOFOL 10 MG/ML
INJECTION, EMULSION INTRAVENOUS AS NEEDED
Status: DISCONTINUED | OUTPATIENT
Start: 2024-05-17 | End: 2024-05-17

## 2024-05-17 RX ORDER — SODIUM CHLORIDE, SODIUM LACTATE, POTASSIUM CHLORIDE, CALCIUM CHLORIDE 600; 310; 30; 20 MG/100ML; MG/100ML; MG/100ML; MG/100ML
INJECTION, SOLUTION INTRAVENOUS CONTINUOUS PRN
Status: DISCONTINUED | OUTPATIENT
Start: 2024-05-17 | End: 2024-05-17

## 2024-05-17 RX ORDER — LIDOCAINE HYDROCHLORIDE 20 MG/ML
INJECTION, SOLUTION EPIDURAL; INFILTRATION; INTRACAUDAL; PERINEURAL AS NEEDED
Status: DISCONTINUED | OUTPATIENT
Start: 2024-05-17 | End: 2024-05-17

## 2024-05-17 RX ADMIN — LIDOCAINE HYDROCHLORIDE 100 MG: 20 INJECTION, SOLUTION EPIDURAL; INFILTRATION; INTRACAUDAL; PERINEURAL at 07:59

## 2024-05-17 RX ADMIN — PROPOFOL 50 MG: 10 INJECTION, EMULSION INTRAVENOUS at 08:10

## 2024-05-17 RX ADMIN — PROPOFOL 50 MG: 10 INJECTION, EMULSION INTRAVENOUS at 08:22

## 2024-05-17 RX ADMIN — PROPOFOL 50 MG: 10 INJECTION, EMULSION INTRAVENOUS at 08:03

## 2024-05-17 RX ADMIN — PROPOFOL 50 MG: 10 INJECTION, EMULSION INTRAVENOUS at 08:19

## 2024-05-17 RX ADMIN — PROPOFOL 50 MG: 10 INJECTION, EMULSION INTRAVENOUS at 08:06

## 2024-05-17 RX ADMIN — Medication 40 MG: at 08:06

## 2024-05-17 RX ADMIN — PROPOFOL 30 MG: 10 INJECTION, EMULSION INTRAVENOUS at 08:16

## 2024-05-17 RX ADMIN — PROPOFOL 30 MG: 10 INJECTION, EMULSION INTRAVENOUS at 08:13

## 2024-05-17 RX ADMIN — PROPOFOL 150 MG: 10 INJECTION, EMULSION INTRAVENOUS at 07:59

## 2024-05-17 RX ADMIN — SODIUM CHLORIDE, SODIUM LACTATE, POTASSIUM CHLORIDE, AND CALCIUM CHLORIDE: .6; .31; .03; .02 INJECTION, SOLUTION INTRAVENOUS at 07:59

## 2024-05-17 NOTE — H&P
"History and Physical -  [facility]  Minor Pearson 64 y.o. male MRN: [id number]                  HPI: Minor Pearson is a 64 y.o. year old male who presents for colonoscopy for +cologuard.      REVIEW OF SYSTEMS: Per the HPI, and otherwise unremarkable.    Historical Information   History reviewed. No pertinent past medical history.  History reviewed. No pertinent surgical history.  Social History   Social History     Substance and Sexual Activity   Alcohol Use Yes    Alcohol/week: 2.0 standard drinks of alcohol    Types: 2 Cans of beer per week    Comment: daily     Social History     Substance and Sexual Activity   Drug Use Not Currently     Social History     Tobacco Use   Smoking Status Never   Smokeless Tobacco Never     Family History   Problem Relation Age of Onset    Alzheimer's disease Mother     Diabetes Father        Meds/Allergies       Current Outpatient Medications:     fluticasone (FLONASE) 50 mcg/act nasal spray    No Known Allergies    Objective     /72   Pulse 80   Temp 98.1 °F (36.7 °C) (Temporal)   Resp 16   Ht 6' 2\" (1.88 m)   Wt (!) 141 kg (310 lb 13.6 oz)   SpO2 93%   BMI 39.91 kg/m²       PHYSICAL EXAM    Gen: NAD  Head: NCAT  CV: RRR  CHEST: Clear  ABD: soft, NT/ND  EXT: no edema      ASSESSMENT/PLAN:  Minor Pearson is a 64 y.o. year old male who presents for colonoscopy for +cologuard. The patient is stable and optimized for the procedure, we reviewed risk and benefits. Risk include but not limited to infection, bleeding, perforation and missing a lesion.          "

## 2024-05-17 NOTE — ANESTHESIA PREPROCEDURE EVALUATION
Procedure:  COLONOSCOPY    Relevant Problems   ANESTHESIA (within normal limits)        Physical Exam    Airway    Mallampati score: III  TM Distance: <3 FB  Neck ROM: full     Dental       Cardiovascular  Rate: normal    Pulmonary  Pulmonary exam normal     Other Findings  Per pt denies anything remaining that is loose or removeable      Anesthesia Plan  ASA Score- 3     Anesthesia Type- IV sedation with anesthesia with ASA Monitors.         Additional Monitors:     Airway Plan:            Plan Factors-Exercise tolerance (METS): >4 METS.    Chart reviewed.    Patient summary reviewed.    Patient is not a current smoker.              Induction- intravenous.    Postoperative Plan-         Informed Consent- Anesthetic plan and risks discussed with patient.  I personally reviewed this patient with the CRNA. Discussed and agreed on the Anesthesia Plan with the CRNA..

## 2024-05-21 PROCEDURE — 88305 TISSUE EXAM BY PATHOLOGIST: CPT | Performed by: PATHOLOGY

## 2024-10-25 ENCOUNTER — OFFICE VISIT (OUTPATIENT)
Dept: FAMILY MEDICINE CLINIC | Facility: CLINIC | Age: 64
End: 2024-10-25
Payer: COMMERCIAL

## 2024-10-25 VITALS
WEIGHT: 315 LBS | DIASTOLIC BLOOD PRESSURE: 72 MMHG | OXYGEN SATURATION: 97 % | HEART RATE: 75 BPM | TEMPERATURE: 97.6 F | BODY MASS INDEX: 40.43 KG/M2 | SYSTOLIC BLOOD PRESSURE: 128 MMHG | HEIGHT: 74 IN

## 2024-10-25 DIAGNOSIS — R19.5 POSITIVE COLORECTAL CANCER SCREENING USING COLOGUARD TEST: ICD-10-CM

## 2024-10-25 DIAGNOSIS — N52.1 ERECTILE DYSFUNCTION DUE TO DISEASES CLASSIFIED ELSEWHERE: Chronic | ICD-10-CM

## 2024-10-25 DIAGNOSIS — E66.09 CLASS 2 OBESITY DUE TO EXCESS CALORIES WITHOUT SERIOUS COMORBIDITY WITH BODY MASS INDEX (BMI) OF 39.0 TO 39.9 IN ADULT: Chronic | ICD-10-CM

## 2024-10-25 DIAGNOSIS — Z91.09 MULTIPLE ENVIRONMENTAL ALLERGIES: Chronic | ICD-10-CM

## 2024-10-25 DIAGNOSIS — Z13.220 SCREENING FOR HYPERLIPIDEMIA: Chronic | ICD-10-CM

## 2024-10-25 DIAGNOSIS — Z76.89 ESTABLISHING CARE WITH NEW DOCTOR, ENCOUNTER FOR: Primary | ICD-10-CM

## 2024-10-25 DIAGNOSIS — Z11.4 SCREENING FOR HIV (HUMAN IMMUNODEFICIENCY VIRUS): ICD-10-CM

## 2024-10-25 DIAGNOSIS — Z13.29 SCREENING FOR THYROID DISORDER: Chronic | ICD-10-CM

## 2024-10-25 DIAGNOSIS — Z00.00 ANNUAL PHYSICAL EXAM: Chronic | ICD-10-CM

## 2024-10-25 DIAGNOSIS — Z12.5 SCREENING FOR PROSTATE CANCER: Chronic | ICD-10-CM

## 2024-10-25 DIAGNOSIS — Z13.1 SCREENING FOR DIABETES MELLITUS: Chronic | ICD-10-CM

## 2024-10-25 DIAGNOSIS — Z23 ENCOUNTER FOR IMMUNIZATION: ICD-10-CM

## 2024-10-25 DIAGNOSIS — J30.89 ENVIRONMENTAL AND SEASONAL ALLERGIES: ICD-10-CM

## 2024-10-25 DIAGNOSIS — E55.9 VITAMIN D DEFICIENCY: Chronic | ICD-10-CM
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
## 2024-10-25 DIAGNOSIS — E66.812 CLASS 2 OBESITY DUE TO EXCESS CALORIES WITHOUT SERIOUS COMORBIDITY WITH BODY MASS INDEX (BMI) OF 39.0 TO 39.9 IN ADULT: Chronic | ICD-10-CM
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
## 2024-10-25 PROBLEM — Z12.11 COLON CANCER SCREENING: Status: ACTIVE | Noted: 2024-10-25

## 2024-10-25 PROBLEM — Z12.11 COLON CANCER SCREENING: Chronic | Status: ACTIVE | Noted: 2024-10-25
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
## 2024-10-25 PROBLEM — E66.813 CLASS 3 SEVERE OBESITY WITHOUT SERIOUS COMORBIDITY WITH BODY MASS INDEX (BMI) OF 40.0 TO 44.9 IN ADULT (HCC): Chronic | Status: ACTIVE | Noted: 2024-03-20

## 2024-10-25 PROBLEM — Z98.890 HISTORY OF COLONOSCOPY: Chronic | Status: ACTIVE | Noted: 2024-10-25

## 2024-10-25 PROBLEM — E66.01 CLASS 3 SEVERE OBESITY WITHOUT SERIOUS COMORBIDITY WITH BODY MASS INDEX (BMI) OF 40.0 TO 44.9 IN ADULT (HCC): Chronic | Status: ACTIVE | Noted: 2024-03-20

## 2024-10-25 PROBLEM — Z98.890 HISTORY OF COLONOSCOPY: Status: ACTIVE | Noted: 2024-10-25

## 2024-10-25 PROCEDURE — 99215 OFFICE O/P EST HI 40 MIN: CPT | Performed by: NURSE PRACTITIONER

## 2024-10-25 RX ORDER — FLUTICASONE PROPIONATE 50 MCG
1 SPRAY, SUSPENSION (ML) NASAL DAILY
Qty: 16 G | Refills: 3 | Status: SHIPPED | OUTPATIENT
Start: 2024-10-25

## 2024-10-25 RX ORDER — SILDENAFIL 50 MG/1
50 TABLET, FILM COATED ORAL DAILY PRN
Qty: 10 TABLET | Refills: 0 | Status: SHIPPED | OUTPATIENT
Start: 2024-10-25

## 2024-10-25 NOTE — PROGRESS NOTES
Ambulatory Visit  Name: Minor Pearson      : 1960      MRN: [id number]  Encounter Provider: ABHI Power  Encounter Date: 10/25/2024   Encounter department: [facility]    Assessment & Plan  Screening for HIV (human immunodeficiency virus)    Orders:    HIV 1/2 AG/AB w Reflex [facility] for 2 yr old and above; Future    Encounter for immunization         Positive colorectal cancer screening using Cologuard test         Vitamin D deficiency    Orders:    Vitamin D 25 hydroxy; Future    Vitamin D 25 hydroxy; Future    Screening for diabetes mellitus    Orders:    Hemoglobin A1C; Future    Hemoglobin A1C; Future    Screening for hyperlipidemia    Orders:    Lipid panel; Future    Lipid panel; Future    Screening for prostate cancer    Orders:    PSA, Total Screen; Future    Annual physical exam    Orders:    CBC and differential; Future    Comprehensive metabolic panel; Future    CBC and differential; Future    Comprehensive metabolic panel; Future    Multiple environmental allergies  Component  Ref Range & Units 3/21/24  9:45 AM   A.ALTERNATA  0.00 - 0.09 kUA/I <0.10   A.FUMIGATUS  0.00 - 0.09 kUA/I <0.10   [place] Grass  0.00 - 0.09 kUA/I 0.13 High    [place]  0.00 - 0.09 kUA/I <0.10   Cat Epithellium-Dander  0.00 - 0.09 kUA/I 0.10 High    C.HERBARUM  0.00 - 0.09 kUA/I <0.10   Cockroach  0.00 - 0.09 kUA/I 1.37 High    Common Silver Birch  0.00 - 0.09 kUA/I <0.10   [place]  0.00 - 0.09 kUA/I <0.10   D. farinae  0.00 - 0.09 kUA/I 0.14 High    D. pteronyssinus  0.00 - 0.09 kUA/I 0.23 High    Dog Dander  0.00 - 0.09 kUA/I 0.17 High    Elm IgE  0.00 - 0.09 kUA/I <0.10   [place] Tree  0.00 - 0.09 kUA/I <0.10   Mugwort  0.00 - 0.09 kUA/I <0.10   [place] Tree  0.00 - 0.09 kUA/I <0.10   Oak  0.00 - 0.09 kUA/I <0.10   P.CHRYSOGENUM  0.00 - 0.09 kUA/I <0.10   Rough Pigweed  IgE  0.00 - 0.09 kUA/I <0.10   Common Ragweed  0.00 - 0.09 kUA/I <0.10   Sheep Sorrel IgE  0.00 -  0.09 kUA/I <0.10   [place] Tree  0.00 - 0.09 kUA/I <0.10   Pavan Grass  0.00 - 0.09 kUA/I 0.48 High    [place] Tree  0.00 - 0.09 kUA/I <0.10   White [place]ich[place] Tree  0.00 - 0.09 kUA/I <0.10   IgE  0 - 113 kU/l 116 High    MOUSE URINE  0.00 - 0.09 kUA/I <0.10          Class 2 obesity due to excess calories without serious comorbidity with body mass index (BMI) of 39.0 to 39.9 in adult    OBESITY    For most healthy adults, the U.S. Department of Health and Human Services recommends these exercise guidelines:  Aerobic activity.   Get at least 150 minutes of moderate aerobic activity or 75 minutes of vigorous aerobic activity a week, or a combination of moderate and vigorous activity.   The guidelines suggest that you spread out this exercise during the course of a week.   Greater amounts of exercise will provide even greater health benefit.   But even small amounts of physical activity are helpful.   Being active for short periods of time throughout the day can add up to provide health benefit.  Strength training.   Do strength training exercises for all major muscle groups at least two times a week.   Aim to do a single set of each exercise, using a weight or resistance level heavy enough to tire your muscles after about 12 to 15 repetitions.    Moderate aerobic exercise includes activities such as brisk walking, swimming and mowing the lawn.   Vigorous aerobic exercise includes activities such as running and aerobic dancing.   Strength training can include use of weight machines, your own body weight, resistance tubing or activities such as rock climbing.      Combined with healthy dietary choices and care with portion size, walking 10,000 steps per day will help you burn calories, which can result in weight loss.     Food tracker hayde like: MyFitnessPal (I am not affiliated with this or any other apps for diet or weight loss)    Exercise hayde like: Yes.Fit (I am not affiliated with this or any other apps for diet or  weight loss)    ______________________________________________________________________________    FDA guidelines recommend that weight-loss medicines may be considered for people who have tried lifestyle changes and meet one or more of the following conditions:   BMI equal or greater than 30.   BMI equal or greater than 27 with one or more obesity-related conditions (like high blood pressure or diabetes).  Have not lost at least 5% of their total body weight in three to six months with lifestyle changes alone.    ______________________________________________________________________________    For even more health benefits, the guidelines suggest getting 300 minutes a week or more of moderate aerobic activity.   Exercising this much may help with weight loss or keeping off lost weight.   If you're trying to lose weight, you should aim for doing cardio at least five days per week for a total of at least 250 minutes (4 hours, 10 minutes) each week.   Contrary to what many believe, you can do aerobic exercise seven days per week.   If this goal seems daunting for you, start slow.     How do weight loss medications work?  There are many types of prescription weight-loss medicines that work differently to help with weight loss. Some are for short-term use (less than 12 weeks). Some can be used for longer periods of time. Many weight loss medications have side effects.  Prescription weight-loss medicines work in one or more of the following ways:  Decrease appetite  Increase feelings of fullness  Interfere with fat absorption    When should you start considering a weight loss medication?  FDA guidelines recommend that weight-loss medicines may be considered for people who have tried lifestyle changes and meet one or more of the following conditions:  BMI equal or greater than 30  BMI equal or greater than 27 with one or more obesity-related conditions (like high blood pressure or diabetes)  Have not lost at least 5% of  their total body weight in three to six months with lifestyle changes alone  It's important for you to talk to your doctor about the risks and benefits of all weight-loss treatments. Weight-loss medications may not be the right treatment for everyone. Make sure your doctor knows your full medical history. Always talk to your doctor before starting or stopping any medicine.    Any weight-loss medicine should be taken along with lifestyle changes, such as exercise and a healthy diet - not in place of them.         Establishing care with new doctor, encounter for         Environmental and seasonal allergies    Orders:    fluticasone (FLONASE) 50 mcg/act nasal spray; 1 spray into each nostril daily    Bug bite, subsequent encounter    Orders:    Lyme Total AB W Reflex to IGM/IGG; Future    Screening for thyroid disorder    Orders:    TSH, 3rd generation with Free T4 reflex; Future    Erectile dysfunction due to diseases classified elsewhere    Orders:    sildenafil (VIAGRA) 50 MG tablet; Take 1 tablet (50 mg total) by mouth daily as needed for erectile dysfunction    Testosterone, free, total; Future      Depression Screening and Follow-up Plan: Clincally patient does not have depression. No treatment is required.       History of Present Illness     The patient is here today to establish care with this provider, we discussed medications and treatment plans.   I reviewed their medical conditions, medications, laboratory and radiology studies.  I reviewed their scheduled future lab studies with the patient.   The patient's current treatment plan is effective.   There is no change in the current therapy.   I ask the patient to continue their current therapy.   The patient voiced their understanding and agreement.   Follow up after 4/22/2025 for his annual physical.  Please continue to the PORCH section of the note for details of today's visit.               History obtained from : patient  Review of Systems  "  Constitutional:  Negative for activity change, chills, fatigue and fever.   HENT:  Negative for rhinorrhea and sore throat.    Eyes:  Negative for pain.   Respiratory:  Negative for cough and shortness of breath.    Cardiovascular:  Negative for chest pain, palpitations and leg swelling.   Gastrointestinal:  Negative for abdominal pain, constipation, diarrhea, nausea and vomiting.   Genitourinary:  Negative for difficulty urinating, flank pain, frequency and urgency.   Musculoskeletal:  Negative for gait problem, joint swelling and myalgias.   Skin:  Negative for color change.   Neurological:  Negative for dizziness, weakness, light-headedness and headaches.   Psychiatric/Behavioral:  Negative for sleep disturbance. The patient is not nervous/anxious.    All other systems reviewed and are negative.    Current Outpatient Medications on File Prior to Visit   Medication Sig Dispense Refill    [DISCONTINUED] fluticasone (FLONASE) 50 mcg/act nasal spray 1 spray into each nostril daily 16 g 3     No current facility-administered medications on file prior to visit.          Objective     /72   Pulse 75   Temp 97.6 °F (36.4 °C) (Tympanic)   Ht 6' 2\" (1.88 m)   Wt (!) 143 kg (316 lb)   SpO2 97%   BMI 40.57 kg/m²     Physical Exam  Vitals and nursing note reviewed.   Constitutional:       General: He is awake.      Appearance: Normal appearance. He is well-developed.   HENT:      Head: Normocephalic and atraumatic.      Nose: Nose normal.      Mouth/Throat:      Mouth: Mucous membranes are moist.   Eyes:      Conjunctiva/sclera: Conjunctivae normal.   Cardiovascular:      Rate and Rhythm: Normal rate and regular rhythm.      Pulses: Normal pulses.      Heart sounds: Normal heart sounds. No murmur heard.  Pulmonary:      Effort: Pulmonary effort is normal. No respiratory distress.      Breath sounds: Normal breath sounds.   Abdominal:      General: Bowel sounds are normal.      Palpations: Abdomen is soft.      " Tenderness: There is no abdominal tenderness.   Musculoskeletal:      Cervical back: Neck supple.      Right lower leg: No edema.      Left lower leg: No edema.   Skin:     General: Skin is warm and dry.   Neurological:      Mental Status: He is alert and oriented to person, place, and time.      Motor: Motor function is intact.      Coordination: Coordination is intact.      Gait: Gait is intact.   Psychiatric:         Attention and Perception: Attention normal.         Mood and Affect: Mood normal.         Speech: Speech normal.         Behavior: Behavior normal. Behavior is cooperative.         Thought Content: Thought content normal.         Cognition and Memory: Cognition normal.         Judgment: Judgment normal.       Administrative Statements   I have spent a total time of 45 minutes in caring for this patient on the day of the visit/encounter including Diagnostic results, Prognosis, Risks and benefits of tx options, Instructions for management, Patient and family education, Importance of tx compliance, Risk factor reductions, Impressions, Counseling / Coordination of care, Documenting in the medical record, Reviewing / ordering tests, medicine, procedures  , and Obtaining or reviewing history  .

## 2024-10-25 NOTE — ASSESSMENT & PLAN NOTE
Component  Ref Range & Units 3/21/24  9:45 AM   A.ALTERNATA  0.00 - 0.09 kUA/I <0.10   A.FUMIGATUS  0.00 - 0.09 kUA/I <0.10   [place] Grass  0.00 - 0.09 kUA/I 0.13 High    [place]  0.00 - 0.09 kUA/I <0.10   Cat Epithellium-Dander  0.00 - 0.09 kUA/I 0.10 High    C.HERBARUM  0.00 - 0.09 kUA/I <0.10   Cockroach  0.00 - 0.09 kUA/I 1.37 High    Common Silver Birch  0.00 - 0.09 kUA/I <0.10   [place]  0.00 - 0.09 kUA/I <0.10   D. farinae  0.00 - 0.09 kUA/I 0.14 High    D. pteronyssinus  0.00 - 0.09 kUA/I 0.23 High    Dog Dander  0.00 - 0.09 kUA/I 0.17 High    Elm IgE  0.00 - 0.09 kUA/I <0.10   [place] Tree  0.00 - 0.09 kUA/I <0.10   Mugwort  0.00 - 0.09 kUA/I <0.10   [place] Tree  0.00 - 0.09 kUA/I <0.10   Oak  0.00 - 0.09 kUA/I <0.10   P.CHRYSOGENUM  0.00 - 0.09 kUA/I <0.10   Rough Pigweed  IgE  0.00 - 0.09 kUA/I <0.10   Common Ragweed  0.00 - 0.09 kUA/I <0.10   Sheep Sorrel IgE  0.00 - 0.09 kUA/I <0.10   [place] Tree  0.00 - 0.09 kUA/I <0.10   Pavan Grass  0.00 - 0.09 kUA/I 0.48 High    [place] Tree  0.00 - 0.09 kUA/I <0.10   [place] Tree  0.00 - 0.09 kUA/I <0.10   IgE  0 - 113 kU/l 116 High    MOUSE URINE  0.00 - 0.09 kUA/I <0.10

## 2024-10-25 NOTE — ASSESSMENT & PLAN NOTE
Orders:    sildenafil (VIAGRA) 50 MG tablet; Take 1 tablet (50 mg total) by mouth daily as needed for erectile dysfunction    Testosterone, free, total; Future

## 2024-10-25 NOTE — ASSESSMENT & PLAN NOTE
OBESITY    For most healthy adults, the U.S. Department of Health and Human Services recommends these exercise guidelines:  Aerobic activity.   Get at least 150 minutes of moderate aerobic activity or 75 minutes of vigorous aerobic activity a week, or a combination of moderate and vigorous activity.   The guidelines suggest that you spread out this exercise during the course of a week.   Greater amounts of exercise will provide even greater health benefit.   But even small amounts of physical activity are helpful.   Being active for short periods of time throughout the day can add up to provide health benefit.  Strength training.   Do strength training exercises for all major muscle groups at least two times a week.   Aim to do a single set of each exercise, using a weight or resistance level heavy enough to tire your muscles after about 12 to 15 repetitions.    Moderate aerobic exercise includes activities such as brisk walking, swimming and mowing the lawn.   Vigorous aerobic exercise includes activities such as running and aerobic dancing.   Strength training can include use of weight machines, your own body weight, resistance tubing or activities such as rock climbing.      Combined with healthy dietary choices and care with portion size, walking 10,000 steps per day will help you burn calories, which can result in weight loss.     Food tracker hayde like: [employer] ParkPal (I am not affiliated with this or any other apps for diet or weight loss)    Exercise hayde like: Yes.Fit (I am not affiliated with this or any other apps for diet or weight loss)    ______________________________________________________________________________    FDA guidelines recommend that weight-loss medicines may be considered for people who have tried lifestyle changes and meet one or more of the following conditions:   BMI equal or greater than 30.   BMI equal or greater than 27 with one or more obesity-related conditions (like high blood  pressure or diabetes).  Have not lost at least 5% of their total body weight in three to six months with lifestyle changes alone.    ______________________________________________________________________________    For even more health benefits, the guidelines suggest getting 300 minutes a week or more of moderate aerobic activity.   Exercising this much may help with weight loss or keeping off lost weight.   If you're trying to lose weight, you should aim for doing cardio at least five days per week for a total of at least 250 minutes (4 hours, 10 minutes) each week.   Contrary to what many believe, you can do aerobic exercise seven days per week.   If this goal seems daunting for you, start slow.     How do weight loss medications work?  There are many types of prescription weight-loss medicines that work differently to help with weight loss. Some are for short-term use (less than 12 weeks). Some can be used for longer periods of time. Many weight loss medications have side effects.  Prescription weight-loss medicines work in one or more of the following ways:  Decrease appetite  Increase feelings of fullness  Interfere with fat absorption    When should you start considering a weight loss medication?  FDA guidelines recommend that weight-loss medicines may be considered for people who have tried lifestyle changes and meet one or more of the following conditions:  BMI equal or greater than 30  BMI equal or greater than 27 with one or more obesity-related conditions (like high blood pressure or diabetes)  Have not lost at least 5% of their total body weight in three to six months with lifestyle changes alone  It's important for you to talk to your doctor about the risks and benefits of all weight-loss treatments. Weight-loss medications may not be the right treatment for everyone. Make sure your doctor knows your full medical history. Always talk to your doctor before starting or stopping any medicine.    Any  weight-loss medicine should be taken along with lifestyle changes, such as exercise and a healthy diet - not in place of them.

## 2024-10-25 NOTE — PATIENT INSTRUCTIONS
_________________________________________________________________  YOU ARE DUE FOR YOUR ANNUAL PHYSICAL EXAM AFTER 4/22/2025.  REPEAT LABS ORDERED, PLEASE HAVE THEM DRAWN THE WEEK PRIOR TO YOUR VISIT (APPROXIMATELY 4/15/2025).  LABS HAVE BEEN ORDERED FOR YOU.   THESE LABS SHOULD BE DRAWN PRIOR TO YOUR NEXT VISIT.  YOU CAN HAVE THEM DRAWN UP TO 1 WEEK PRIOR TO YOUR NEXT VISIT.  HAVING YOUR BLOOD WORK WHEN YOU COME TO YOUR NEXT VISIT WILL ALLOW ME TO PROVIDE THE BEST MEDICAL CARE FOR YOU WITHOUT HAVING EITHER OF US PERFORM MORE WORK THAN NECESSARY.  PLEASE BE COMPLIANT WITH THIS REQUEST.   _____________________________________________________________________

## 2024-10-25 NOTE — ASSESSMENT & PLAN NOTE
Orders:    CBC and differential; Future    Comprehensive metabolic panel; Future    CBC and differential; Future    Comprehensive metabolic panel; Future

## 2024-11-24 PROBLEM — Z11.4 SCREENING FOR HIV (HUMAN IMMUNODEFICIENCY VIRUS): Status: RESOLVED | Noted: 2024-10-25 | Resolved: 2024-11-24

## 2024-12-16 ENCOUNTER — PATIENT OUTREACH (OUTPATIENT)
Age: 64
End: 2024-12-16

## 2024-12-16 NOTE — PROGRESS NOTES
"Weight History     Highest Weight: 315#  Lowest Weight: 225#    Past Attempts at Weight Loss: \"not really;\" used to be active (diana), but now retired (since end of summer 2023)    Food Recall  Breakfast: 8-10 AM- couple of eggs with beach or scrapple or sausage OR cereal twice weekly (Frosted Mini Wheats, 2% milk)  Snack: none   Lunch: 12:30 PM- can of soup, oyster crackers OR sandwich (turkey and cheese, rogers), sometimes chips, but tries to limit  Snack: none  Dinner: 5:30 PM- chicken, vegetables, starch; wife (who he stated attend [employer]) keeping portions in check   Snack: a couple of Bonesteel cookies, cut out chips     Beverages: beer- 6 daily; water- 2-3 bottles, coffee- 8-16 oz with half and half   Volume of Beverage Intake: >=114 oz    Frequency of Dining out: once weekly (pizza)     Would the patient benefit from visit with  specialist?: No    Physical Activity Intake  Activity: yard work, cuts firewood   Frequency of Exercise: as needed  Physical Limitations to Exercise: none    Review of Programs  Overview of medical weight management programs provided?: Yes   Is patient interested in discussing medication?: Yes   Is patient interested in discussing bariatric surgery?: No  Does patient know which pathway they are interested in?: conservative program with medication    "

## 2024-12-19 ENCOUNTER — OFFICE VISIT (OUTPATIENT)
Dept: BARIATRICS | Facility: CLINIC | Age: 64
End: 2024-12-19
Payer: COMMERCIAL

## 2024-12-19 VITALS
OXYGEN SATURATION: 96 % | RESPIRATION RATE: 19 BRPM | HEIGHT: 73 IN | WEIGHT: 315 LBS | DIASTOLIC BLOOD PRESSURE: 88 MMHG | SYSTOLIC BLOOD PRESSURE: 138 MMHG | HEART RATE: 69 BPM | BODY MASS INDEX: 41.75 KG/M2 | TEMPERATURE: 98.4 F

## 2024-12-19 DIAGNOSIS — E66.01 OBESITY, CLASS III, BMI 40-49.9 (MORBID OBESITY) (HCC): ICD-10-CM

## 2024-12-19 DIAGNOSIS — E66.01 CLASS 3 SEVERE OBESITY WITHOUT SERIOUS COMORBIDITY WITH BODY MASS INDEX (BMI) OF 40.0 TO 44.9 IN ADULT, UNSPECIFIED OBESITY TYPE (HCC): ICD-10-CM

## 2024-12-19 DIAGNOSIS — E66.813 CLASS 3 SEVERE OBESITY WITHOUT SERIOUS COMORBIDITY WITH BODY MASS INDEX (BMI) OF 40.0 TO 44.9 IN ADULT, UNSPECIFIED OBESITY TYPE (HCC): ICD-10-CM

## 2024-12-19 PROCEDURE — 99244 OFF/OP CNSLTJ NEW/EST MOD 40: CPT | Performed by: PHYSICIAN ASSISTANT

## 2024-12-19 NOTE — PROGRESS NOTES
Assessment/Plan:    Minor Pearson  is a 64 y.o. male with excess weight/obesity here to pursue weight management.      Obesity, Class III, BMI 40-49.9 (morbid obesity) (HCC)  - Discussed options of HealthyCORE-Intensive Lifestyle Intervention Program, Very Low Calorie Diet-VLCD, and Conservative Program and the role of weight loss medications.  - Initial weight loss goal of 5-10% weight loss for improved health  - Patient is interested in pursuing Conservative Program  - Screening labs: Ordered in system, will get done before next visit  - Calorie goal: 0451-8061  - Start Wegovy 0.25mg once weekly  - 5/8 stop bang, patient not interested in sleep study at this time, will reconsider if medication not approved, will improve with weight loss  - Medication agreement signed: Yes  - Follow up in 2 months  - Dietician: Not interested at this time          Goals/General Recommendations:     Food log (ie.) www.[employer].com,sparkpeople.com, loseit.com, calorieking.com, etc.   Practice mindful eating.  Be sure to set aside time to eat, eat slowly, and savor your food.  Do NOT drink your calories and aim for AT LEAST 64oz of water daily. No sugar sweetened beverages.  No juice (eat the fruit instead).  Eat breakfast daily.  Do not skip meals.    Exercise:   Increase physical activity by 10 minutes daily. Gradually increase physical activity to a goal of 5 days per week for 30 minutes of MODERATE intensity PLUS 2 days per week of FULL BODY resistance training. Resistance training can increase muscle mass and increase our resting metabolic rate.   FULL BODY resistance training is recommended 2-3 times a week.  Do not do this on consecutive days to allow for muscle recovery.  Aim for a bare minimum 8-10,000 steps, even on days you do not exercise.     Monitoring:   Weigh yourself daily.  If this causes undue stress, then just weigh yourself once a week.  Weigh yourself the same time of the day with the same amount of clothing  on.  Preferably this should be done after waking up, before you eat, and with no clothing or minimal clothing on.    Follow up in approximately 2 months with Surgical Advanced Practitioner.    Diagnoses and all orders for this visit:    Class 3 severe obesity without serious comorbidity with body mass index (BMI) of 40.0 to 44.9 in adult, unspecified obesity type ([facility])  -     Ambulatory Referral to Weight Management  -     Semaglutide-Weight Management (WEGOVY) 0.25 MG/0.5ML; Inject 0.5 mL (0.25 mg total) under the skin once a week    Obesity, Class III, BMI 40-49.9 (morbid obesity) ([facility])        Food log (ie.) www.myfitnesspal.com,sparkpeople.com,loseit.com,calorieking.com,etc.   Goal protein intake of 60-80 grams per day  25-35 grams of dietary fiber per day  5-10 servings of fruits and vegetables per day  2766-5122 calories per day goal      - Discussed options of HealthyCORE-Intensive Lifestyle Intervention Program, Very Low Calorie Diet-VLCD, and Conservative Program and the role of weight loss medications.  - Explained the importance of making lifestyle changes first before starting anti-obesity medications.  - Patient is interested in pursuing Conservative Program  - Initial weight loss goal of 5-10% weight loss for improved health as studies have shown this is where we see the greatest impact on improving health and decreasing risk of obesity related conditions.  - Weight loss can improve patient's co-morbid conditions and/or prevent weight-related complications.  - Stop Bang 5/8, recommended referral to sleep medicine but patient not interested   - Labs reviewed: Labs already in system, encouraged patient to complete        Subjective:     Patient ID: Minor Pearson  is a 64 y.o. male with excess weight/obesity here to pursue weight managment.    History reviewed. No pertinent past medical history.    HPI:    Severity: Morbid  Onset:  Early 40s    Highest weight: 317 lb  Lowest Weight: 225 lb  Current  weight: 317 lb  What has been tried: Diet and Exercise  Contributing factors: Poor Food Choices and Insufficient Physical Activity  Associated symptoms and effects: increased joint pain, decreased exercise capacity, and decreased mobility   Goal weight: 250lb  5% weight loss = 15 lb  20% weight loss = 63 lb    Wt Readings from Last 20 Encounters:   12/19/24 (!) 144 kg (317 lb)   10/25/24 (!) 143 kg (316 lb)   05/17/24 (!) 141 kg (310 lb 13.6 oz)   05/08/24 (!) 143 kg (316 lb)   04/22/24 (!) 144 kg (316 lb 9.6 oz)   03/20/24 (!) 144 kg (317 lb 3.2 oz)        Food logging:  B: 2 eggs + 2 wheat toast + scrapple or cereal + 2 cups coffee + half and half  S: skip  L: soup + crackers or turkey + cheese on wheat bread with rogers  S: bologna + cheese  D: fish or wings or chicken parm + beans + brussel sprouts  S: skips     Hunger/Cravings: salty  Dining out: Once a week  Hydration: water 64 ounces, light beer 3-6 per day  Alcohol: 3-6 beer per day  Smoking: Denies  Exercise: Works around the yard, has a dog he plays with, has stationary bike  Weight Monitoring: Once a week  Sleep: 9 hours  STOP-BANG Score: 5/8, recommended sleep medicine for sleep study   Occupation: Retired  Contraception: N/A  Colonoscopy: 5/17/24 3 year recall    Patient denies personal and family history of pancreatitis, thyroid cancer, MEN-2 tumors.  Denies any hx of glaucoma, seizures, kidney stones, gallstones.  Denies Hx of CAD, PAD, palpitations, arrhythmia.   Denies uncontrolled anxiety or depression, suicidal behavior or thinking, insomnia or sleep disturbance.       The following portions of the patient's history were reviewed and updated as appropriate: allergies, current medications, past family history, past medical history, past social history, past surgical history, and problem list.    Review of Systems   Constitutional:  Negative for chills and fever.   HENT:  Negative for ear pain and sore throat.    Eyes:  Negative for pain and visual  "disturbance.   Respiratory:  Negative for cough and shortness of breath.    Cardiovascular:  Negative for chest pain and palpitations.   Gastrointestinal:  Negative for abdominal pain and vomiting.   Genitourinary:  Negative for dysuria and hematuria.   Musculoskeletal:  Negative for arthralgias and back pain.   Skin:  Negative for color change and rash.   Neurological:  Negative for seizures and syncope.   All other systems reviewed and are negative.      Objective:    /88 (BP Location: Right arm, Patient Position: Sitting, Cuff Size: Large)   Pulse 69   Temp 98.4 °F (36.9 °C) (Temporal)   Resp 19   Ht 6' 1\" (1.854 m)   Wt (!) 144 kg (317 lb)   SpO2 96%   BMI 41.82 kg/m²     Physical Exam  Constitutional:       Appearance: Normal appearance. He is obese.   HENT:      Head: Normocephalic and atraumatic.      Nose: Nose normal.      Mouth/Throat:      Mouth: Mucous membranes are moist.   Eyes:      Extraocular Movements: Extraocular movements intact.      Pupils: Pupils are equal, round, and reactive to light.   Cardiovascular:      Rate and Rhythm: Normal rate and regular rhythm.      Pulses: Normal pulses.      Heart sounds: Normal heart sounds.   Pulmonary:      Effort: Pulmonary effort is normal.      Breath sounds: Normal breath sounds.   Abdominal:      Palpations: Abdomen is soft.   Musculoskeletal:      Cervical back: Normal range of motion.   Skin:     General: Skin is warm.   Neurological:      General: No focal deficit present.      Mental Status: He is alert and oriented to person, place, and time.   Psychiatric:         Mood and Affect: Mood normal.         Behavior: Behavior normal.           Adriana Zayas PA-C  Bariatric Surgery    "

## 2024-12-19 NOTE — ASSESSMENT & PLAN NOTE
- Discussed options of HealthyCORE-Intensive Lifestyle Intervention Program, Very Low Calorie Diet-VLCD, and Conservative Program and the role of weight loss medications.  - Initial weight loss goal of 5-10% weight loss for improved health  - Patient is interested in pursuing Conservative Program  - Screening labs: Ordered in system, will get done before next visit  - Calorie goal: 9067-2089  - Start Wegovy 0.25mg once weekly  - 5/8 stop bang, patient not interested in sleep study at this time, will reconsider if medication not approved, will improve with weight loss  - Medication agreement signed: Yes  - Follow up in 2 months  - Dietician: Not interested at this time

## 2024-12-19 NOTE — PATIENT INSTRUCTIONS
Visit wegovy.com for further information/injection instructions. Please eat small frequent meals to help reduce nausea. Lemon water and saltine crackers may help with this. If you experience fever, nausea/vomiting, and pain radiating to your back this may be a sign of pancreatitis. Please have ED evaluation if this occurs.     Always stop wegovy 1 week before any surgical procedures due to risk of aspiration     Call or send message after 2nd injection with update

## 2024-12-24 ENCOUNTER — TELEPHONE (OUTPATIENT)
Dept: ENDOCRINOLOGY | Facility: CLINIC | Age: 64
End: 2024-12-24

## 2025-01-09 DIAGNOSIS — E66.813 CLASS 3 SEVERE OBESITY WITHOUT SERIOUS COMORBIDITY WITH BODY MASS INDEX (BMI) OF 40.0 TO 44.9 IN ADULT, UNSPECIFIED OBESITY TYPE (HCC): ICD-10-CM

## 2025-01-09 DIAGNOSIS — E66.01 CLASS 3 SEVERE OBESITY WITHOUT SERIOUS COMORBIDITY WITH BODY MASS INDEX (BMI) OF 40.0 TO 44.9 IN ADULT, UNSPECIFIED OBESITY TYPE (HCC): ICD-10-CM

## 2025-01-09 NOTE — TELEPHONE ENCOUNTER
Reason for call:   [x] Refill   [] Prior Auth  [] Other:     Office:   [] PCP/Provider -   [x] Specialty/Provider - Adriana Zayas    Medication: Adriana Zayas    Dose/Frequency: Wegovy    Quantity: 0.25 mg Weekly     Pharmacy: [facility] [address]    Does the patient have enough for 3 days?   [x] Yes   [] No - Send as HP to POD  How are you tolerating the medication?   [] Nausea  [] Vomiting  [] Diarrhea  [x] Asymptomatic  [] Other:    Last visit weight:    Current weight:308    Date of last injection:01/06/25    How many injections do you have left: 2

## 2025-01-23 ENCOUNTER — TELEPHONE (OUTPATIENT)
Age: 65
End: 2025-01-23

## 2025-01-23 NOTE — TELEPHONE ENCOUNTER
Patient of Adriana calling about his Wegovy 0.5mg.    Patient was just increased from 0.25mg to 0.5mg.    He picked up his medication from the pharmacy but the order was submitted as Wegovy 0.25mg with instructions to take 0.5mg. The pharmacy dispensed 2 boxes of 0.25mg and instructed him to take 2 injections each week to equal 0.5mg.    Patient is unsure if this is correct and would like the order corrected. Stated they will return the boxes to the pharmacy and get the correct order once available.    They do not want to keep the 2 boxes also because they do not want any issues with insurance rejecting dispenses in the future.    Please submit correct order to patients pharmacy  [facility]/pharmacy #[id number] - [place] - [address]  [address] [address]  Phone: [phone]  Fax: [phone]     Next injection is due Monday   Please advise patient how to proceed  #[phone]

## 2025-01-27 NOTE — TELEPHONE ENCOUNTER
[facility] calling to f/u re wegovy rx issues. Tech notes she did test claim on 0.5mg wegovy and insurance covering, just not approving him doubling up on 0.25mg dosing. Requesting 0.5mg rx be sent, noted will route for review.

## 2025-01-28 ENCOUNTER — TELEPHONE (OUTPATIENT)
Dept: BARIATRICS | Facility: CLINIC | Age: 65
End: 2025-01-28

## 2025-01-28 NOTE — TELEPHONE ENCOUNTER
Received call from [facility] pharmacy.  The patient returned the original 2 boxes of 0.25mg. He was refunded.  The pharmacy needs a new order for Wegovy 0.5mg.   He is not able to take 2 injections of 0.25mg to equal the appropriate dose of 0.5mg.  The insurance will only cover if a new order is submitted.    Please submit a new order of Wegovy 0.5mg to pharmacy so patient can  his medication.  [facility]/pharmacy #[id number] - [place] - [address]  [address] [address]  Phone: [phone]  Fax: [phone]       Patient CB#[phone]

## 2025-01-29 DIAGNOSIS — E66.01 OBESITY, CLASS III, BMI 40-49.9 (MORBID OBESITY) (HCC): Primary | ICD-10-CM

## 2025-02-10 ENCOUNTER — TELEPHONE (OUTPATIENT)
Dept: BARIATRICS | Facility: CLINIC | Age: 65
End: 2025-02-10

## 2025-02-10 NOTE — TELEPHONE ENCOUNTER
Patient of Adriana Roberson Pa-c. Is currently on Wegovy 0.5 mg. Has two pens left. Would like to increase to next dose. Doing well. No issues.    Medication: Wegovy    Dose/Frequency: 1 mg    Quantity: 2 ml    Pharmacy:   [facility]/pharmacy #[id number] - [place] - [address]  [address] [address]  Phone: [phone]  Fax: [phone]       Office:   [] PCP/Provider -   [x] Speciality/Provider - Adriana Zayas PA-C    Does the patient have enough for 3 days?   [x] Yes   [] No - Send as HP to POD

## 2025-02-11 DIAGNOSIS — E66.01 OBESITY, CLASS III, BMI 40-49.9 (MORBID OBESITY) (HCC): Primary | ICD-10-CM

## 2025-02-27 ENCOUNTER — OFFICE VISIT (OUTPATIENT)
Dept: BARIATRICS | Facility: CLINIC | Age: 65
End: 2025-02-27
Payer: COMMERCIAL

## 2025-02-27 VITALS
SYSTOLIC BLOOD PRESSURE: 120 MMHG | OXYGEN SATURATION: 95 % | HEART RATE: 87 BPM | TEMPERATURE: 98.7 F | BODY MASS INDEX: 39.79 KG/M2 | DIASTOLIC BLOOD PRESSURE: 74 MMHG | RESPIRATION RATE: 18 BRPM | HEIGHT: 73 IN | WEIGHT: 300.2 LBS

## 2025-02-27 DIAGNOSIS — E66.812 OBESITY, CLASS II, BMI 35-39.9: Primary | ICD-10-CM

## 2025-02-27 PROCEDURE — 99214 OFFICE O/P EST MOD 30 MIN: CPT | Performed by: PHYSICIAN ASSISTANT

## 2025-02-27 NOTE — ASSESSMENT & PLAN NOTE
- Initial weight loss goal of 5-10% weight loss for improved health  - Patient is pursuing Conservative Program  - Screening labs: Ordered in system, reminded to get done before next visit  - Calorie goal: 4135-0061  - Continue Wegovy, will increase to first maintenance dose of 1.7mg once weekly (patient will message me when needs a refill)  -Stay within calorie goals, increase protein, decrease fluid calories, increase steps per day to 8k minimum  - 5/8 stop bang, patient not interested in sleep study at this time, will reconsider in future, will improve with weight loss  - Medication agreement signed: Yes, at consult  - Follow up in 3 months  - Dietician: Not interested at this time

## 2025-02-27 NOTE — PROGRESS NOTES
Assessment/Plan:    Obesity, Class II, BMI 35-39.9  - Initial weight loss goal of 5-10% weight loss for improved health  - Patient is pursuing Conservative Program  - Screening labs: Ordered in system, reminded to get done before next visit  - Calorie goal: 2436-7346  - Continue Wegovy, will increase to first maintenance dose of 1.7mg once weekly (patient will message me when needs a refill)  -Stay within calorie goals, increase protein, decrease fluid calories, increase steps per day to 8k minimum  - 5/8 stop bang, patient not interested in sleep study at this time, will reconsider in future, will improve with weight loss  - Medication agreement signed: Yes, at consult  - Follow up in 3 months  - Dietician: Not interested at this time        Visit [email or web address] for further information/injection instructions. Please eat small frequent meals to help reduce nausea. Lemon water and saltine crackers may help with this. If you experience fever, nausea/vomiting, and pain radiating to your back this may be a sign of pancreatitis. Please have ED evaluation if this occurs.     Always stop wegovy 1 week before any surgical procedures due to risk of aspiration       Goals:    Food log (ie.) www.myfitnesspal.com,sparkpeople.com,loseit.com,calorieking.com,etc.   No sugary beverages. At least 64oz of water daily.  Increase physical activity by 10 minutes daily. Gradually increase physical activity to a goal of 5 days per week for 30 minutes of MODERATE intensity PLUS 2 days per week of FULL BODY resistance training    Calorie goal: 1345-2930 calories per day  Food log (ie.) www.myfitnesspal.com,sparkpeople.com,loseit.com,calorieking.com,etc.   No sugary beverages. At least 64oz of water daily.  Goal protein intake of 60-80 grams per day  25-35 grams of dietary fiber per day    Follow up in approximately 3 months with Surgical Advanced Practitioner.     Diagnoses and all orders for this visit:    Obesity, Class II, BMI 35-39.9           Subjective:   Chief Complaint   Patient presents with    Follow-up        Patient ID: Minor Pearson  is a 64 y.o. male with excess weight/obesity here to pursue weight managment.  Patient is pursuing Conservative Program.     Patient did not complete labs, reminded to complete    HPI    Weight at last visit: 317 lb (12/19/24)  Current weight: 300 lb  Change: -17lbs    Wt Readings from Last 10 Encounters:   02/27/25 (!) 136 kg (300 lb 3.2 oz)   12/19/24 (!) 144 kg (317 lb)   10/25/24 (!) 143 kg (316 lb)   05/17/24 (!) 141 kg (310 lb 13.6 oz)   05/08/24 (!) 143 kg (316 lb)   04/22/24 (!) 144 kg (316 lb 9.6 oz)   03/20/24 (!) 144 kg (317 lb 3.2 oz)      Food logging:  B: 2 eggs + 2 wheat toast + scrapple or cereal + 2 cups coffee + half and half  S: skip  L: soup + crackers or turkey + cheese on wheat bread with rogers  S: bologna + cheese  D: fish or wings or chicken parm + beans + brussel sprouts  S: skips     Hunger/Cravings: salty  Dining out: Once a week  Hydration: water 64 ounces, light beer 3-6 per day  Alcohol: 1-2 beer per day (down from 3-6 per day)  Smoking: Denies  Exercise: Works around the yard, has a dog he plays with, has stationary bike  Weight Monitoring: Once a week  Sleep: 9 hours  STOP-BANG Score: 5/8, recommended sleep medicine for sleep study   Occupation: Retired  Contraception: N/A  Colonoscopy: 5/17/24 3 year recall     Patient denies personal and family history of pancreatitis, thyroid cancer, MEN-2 tumors.  Denies any hx of glaucoma, seizures, kidney stones, gallstones.  Denies Hx of CAD, PAD, palpitations, arrhythmia.   Denies uncontrolled anxiety or depression, suicidal behavior or thinking, insomnia or sleep disturbance.        The following portions of the patient's history were reviewed and updated as appropriate: allergies, current medications, past family history, past medical history, past social history, past surgical history, and problem list.    Review of Systems  "  Constitutional:  Negative for chills and fever.   HENT:  Negative for ear pain and sore throat.    Eyes:  Negative for pain and visual disturbance.   Respiratory:  Negative for cough and shortness of breath.    Cardiovascular:  Negative for chest pain and palpitations.   Gastrointestinal:  Negative for abdominal pain and vomiting.   Genitourinary:  Negative for dysuria and hematuria.   Musculoskeletal:  Negative for arthralgias and back pain.   Skin:  Negative for color change and rash.   Neurological:  Negative for seizures and syncope.   All other systems reviewed and are negative.      Objective:    /74 (BP Location: Right arm, Patient Position: Sitting, Cuff Size: Large)   Pulse 87   Temp 98.7 °F (37.1 °C) (Temporal)   Resp 18   Ht 6' 1\" (1.854 m)   Wt (!) 136 kg (300 lb 3.2 oz)   SpO2 95%   BMI 39.61 kg/m²      Physical Exam  Constitutional:       Appearance: Normal appearance. He is obese.   HENT:      Head: Normocephalic and atraumatic.      Nose: Nose normal.      Mouth/Throat:      Mouth: Mucous membranes are moist.   Eyes:      Extraocular Movements: Extraocular movements intact.      Pupils: Pupils are equal, round, and reactive to light.   Cardiovascular:      Rate and Rhythm: Normal rate and regular rhythm.      Pulses: Normal pulses.      Heart sounds: Normal heart sounds.   Pulmonary:      Effort: Pulmonary effort is normal.      Breath sounds: Normal breath sounds.   Abdominal:      Palpations: Abdomen is soft.   Musculoskeletal:      Cervical back: Normal range of motion.   Skin:     General: Skin is warm.   Neurological:      General: No focal deficit present.      Mental Status: He is alert and oriented to person, place, and time.   Psychiatric:         Mood and Affect: Mood normal.         Behavior: Behavior normal.        "

## 2025-02-27 NOTE — PATIENT INSTRUCTIONS
Visit wegovy.com for further information/injection instructions. Please eat small frequent meals to help reduce nausea. Lemon water and saltine crackers may help with this. If you experience fever, nausea/vomiting, and pain radiating to your back this may be a sign of pancreatitis. Please have ED evaluation if this occurs.     Always stop wegovy 1 week before any surgical procedures due to risk of aspiration     Get labs done before next visit.     Stay within calorie goals, increase protein, decrease fluid calories, increase steps per day    Call or message me when you need the refill of first maintenance dose.   
No

## 2025-03-14 DIAGNOSIS — E66.812 OBESITY, CLASS II, BMI 35-39.9: Primary | ICD-10-CM

## 2025-03-19 DIAGNOSIS — E66.812 OBESITY, CLASS II, BMI 35-39.9: ICD-10-CM

## 2025-03-19 NOTE — TELEPHONE ENCOUNTER
NOT A DUPLICATE   Pharmacy told patient they didn't receive anything. Please resend.    Reason for call:   [x] Refill   [] Prior Auth  [] Other:     Office:   [] PCP/Provider -   [x] Specialty/Provider - Weight management     Medication: Wegovy     Dose/Frequency: 1.7 mg/0.75 mL. Inject 0.75 mL under the skin once a week     Quantity: 3 mL    Pharmacy: [facility] [place] [address] [phone]     Local Pharmacy   Does the patient have enough for 3 days?   [] Yes   [x] No - Send as HP to POD    Mail Away Pharmacy   Does the patient have enough for 10 days?   [] Yes   [] No - Send as HP to POD       English

## 2025-03-20 DIAGNOSIS — E66.812 OBESITY, CLASS II, BMI 35-39.9: Primary | ICD-10-CM

## 2025-03-20 NOTE — PROGRESS NOTES
Rx sent on 3/14 but got message pharmacy never received.  Sent another Rx for Wegovy 1.7mg to [facility] with 1 refill.

## 2025-03-24 ENCOUNTER — TELEPHONE (OUTPATIENT)
Age: 65
End: 2025-03-24

## 2025-03-24 DIAGNOSIS — E66.812 OBESITY, CLASS II, BMI 35-39.9: ICD-10-CM

## 2025-04-22 LAB
ALBUMIN SERPL-MCNC: 4.6 G/DL (ref 3.5–5.7)
ALP SERPL-CCNC: 101 U/L (ref 35–120)
ALT SERPL-CCNC: 21 U/L
ANION GAP SERPL CALCULATED.3IONS-SCNC: 9 MMOL/L (ref 3–11)
AST SERPL-CCNC: 17 U/L
BASOPHILS # BLD AUTO: 0 THOU/CMM (ref 0–0.1)
BASOPHILS NFR BLD AUTO: 1 %
BILIRUB SERPL-MCNC: 0.8 MG/DL (ref 0.2–1)
BUN SERPL-MCNC: 13 MG/DL (ref 7–28)
CALCIUM SERPL-MCNC: 9.8 MG/DL (ref 8.5–10.5)
CHLORIDE SERPL-SCNC: 102 MMOL/L (ref 100–109)
CHOLEST SERPL-MCNC: 158 MG/DL
CHOLEST/HDLC SERPL: 3.1 {RATIO}
CO2 SERPL-SCNC: 29 MMOL/L (ref 21–31)
CREAT SERPL-MCNC: 0.77 MG/DL (ref 0.53–1.3)
CYTOLOGY CMNT CVX/VAG CYTO-IMP: NORMAL
DIFFERENTIAL METHOD BLD: ABNORMAL
EOSINOPHIL # BLD AUTO: 0.2 THOU/CMM (ref 0–0.5)
EOSINOPHIL NFR BLD AUTO: 3 %
ERYTHROCYTE [DISTWIDTH] IN BLOOD BY AUTOMATED COUNT: 13.3 % (ref 12–16)
EST. AVERAGE GLUCOSE BLD GHB EST-MCNC: 108 MG/DL
GFR/BSA.PRED SERPLBLD CYS-BASED-ARV: 100 ML/MIN/{1.73_M2}
GLUCOSE SERPL-MCNC: 84 MG/DL (ref 65–99)
HBA1C MFR BLD: 5.4 %
HCT VFR BLD AUTO: 50.7 % (ref 37–48)
HDLC SERPL-MCNC: 51 MG/DL (ref 23–92)
HGB BLD-MCNC: 17.2 G/DL (ref 12.5–17)
LDLC SERPL CALC-MCNC: 93 MG/DL
LYMPHOCYTES # BLD AUTO: 1.9 THOU/CMM (ref 1–3)
LYMPHOCYTES NFR BLD AUTO: 28 %
MCH RBC QN AUTO: 29.5 PG (ref 27–36)
MCHC RBC AUTO-ENTMCNC: 34 G/DL (ref 32–37)
MCV RBC AUTO: 87 FL (ref 80–100)
MONOCYTES # BLD AUTO: 0.6 THOU/CMM (ref 0.3–1)
MONOCYTES NFR BLD AUTO: 9 %
NEUTROPHILS # BLD AUTO: 4.2 THOU/CMM (ref 1.8–7.8)
NEUTROPHILS NFR BLD AUTO: 59 %
NONHDLC SERPL-MCNC: 107 MG/DL
PLATELET # BLD AUTO: 213 THOU/CMM (ref 140–350)
PMV BLD REES-ECKER: 9 FL (ref 7.5–11.3)
POTASSIUM SERPL-SCNC: 4.7 MMOL/L (ref 3.5–5.2)
PROT SERPL-MCNC: 7.4 G/DL (ref 6.3–8.3)
RBC # BLD AUTO: 5.83 MILL/CMM (ref 4–5.4)
SODIUM SERPL-SCNC: 140 MMOL/L (ref 135–145)
TRIGL SERPL-MCNC: 72 MG/DL
TSH SERPL-ACNC: 1.8 UIU/ML (ref 0.45–5.33)
WBC # BLD AUTO: 7 THOU/CMM (ref 4–10.5)

## 2025-04-29 ENCOUNTER — OFFICE VISIT (OUTPATIENT)
Dept: FAMILY MEDICINE CLINIC | Facility: CLINIC | Age: 65
End: 2025-04-29
Payer: COMMERCIAL

## 2025-04-29 VITALS
SYSTOLIC BLOOD PRESSURE: 120 MMHG | OXYGEN SATURATION: 97 % | RESPIRATION RATE: 16 BRPM | HEIGHT: 73 IN | TEMPERATURE: 97.7 F | HEART RATE: 80 BPM | WEIGHT: 287.6 LBS | DIASTOLIC BLOOD PRESSURE: 70 MMHG | BODY MASS INDEX: 38.12 KG/M2

## 2025-04-29 DIAGNOSIS — Z12.5 SCREENING FOR MALIGNANT NEOPLASM OF PROSTATE: ICD-10-CM

## 2025-04-29 DIAGNOSIS — Z00.00 ANNUAL PHYSICAL EXAM: Primary | Chronic | ICD-10-CM

## 2025-04-29 DIAGNOSIS — R73.01 IMPAIRED FASTING GLUCOSE: ICD-10-CM

## 2025-04-29 DIAGNOSIS — Z23 ENCOUNTER FOR IMMUNIZATION: ICD-10-CM

## 2025-04-29 DIAGNOSIS — E78.00 PURE HYPERCHOLESTEROLEMIA: ICD-10-CM

## 2025-04-29 DIAGNOSIS — E55.9 VITAMIN D DEFICIENCY: Chronic | ICD-10-CM

## 2025-04-29 DIAGNOSIS — Z79.899 ON LONG TERM DRUG THERAPY: ICD-10-CM

## 2025-04-29 PROCEDURE — 99396 PREV VISIT EST AGE 40-64: CPT | Performed by: NURSE PRACTITIONER

## 2025-04-29 NOTE — ASSESSMENT & PLAN NOTE
Orders:    Hemoglobin A1C; Future    Lipid Panel with Direct LDL reflex; Future    Vitamin D 25 hydroxy; Future

## 2025-04-29 NOTE — PATIENT INSTRUCTIONS
"Patient Education     Routine physical for adults   The Basics   Written by the doctors and editors at [facility]   What is a physical? -- A physical is a routine visit, or \"check-up,\" with your doctor. You might also hear it called a \"wellness visit\" or \"preventive visit.\"  During each visit, the doctor will:   Ask about your physical and mental health   Ask about your habits, behaviors, and lifestyle   Do an exam   Give you vaccines if needed   Talk to you about any medicines you take   Give advice about your health   Answer your questions  Getting regular check-ups is an important part of taking care of your health. It can help your doctor find and treat any problems you have. But it's also important for preventing health problems.  A routine physical is different from a \"sick visit.\" A sick visit is when you see a doctor because of a health concern or problem. Since physicals are scheduled ahead of time, you can think about what you want to ask the doctor.  How often should I get a physical? -- It depends on your age and health. In general, for people age 21 years and older:   If you are younger than 50 years, you might be able to get a physical every 3 years.   If you are 50 years or older, your doctor might recommend a physical every year.  If you have an ongoing health condition, like diabetes or high blood pressure, your doctor will probably want to see you more often.  What happens during a physical? -- In general, each visit will include:   Physical exam - The doctor or nurse will check your height, weight, heart rate, and blood pressure. They will also look at your eyes and ears. They will ask about how you are feeling and whether you have any symptoms that bother you.   Medicines - It's a good idea to bring a list of all the medicines you take to each doctor visit. Your doctor will talk to you about your medicines and answer any questions. Tell them if you are having any side effects that bother you. You " "should also tell them if you are having trouble paying for any of your medicines.   Habits and behaviors - This includes:   Your diet   Your exercise habits   Whether you smoke, drink alcohol, or use drugs   Whether you are sexually active   Whether you feel safe at home  Your doctor will talk to you about things you can do to improve your health and lower your risk of health problems. They will also offer help and support. For example, if you want to quit smoking, they can give you advice and might prescribe medicines. If you want to improve your diet or get more physical activity, they can help you with this, too.   Lab tests, if needed - The tests you get will depend on your age and situation. For example, your doctor might want to check your:   Cholesterol   Blood sugar   Iron level   Vaccines - The recommended vaccines will depend on your age, health, and what vaccines you already had. Vaccines are very important because they can prevent certain serious or deadly infections.   Discussion of screening - \"Screening\" means checking for diseases or other health problems before they cause symptoms. Your doctor can recommend screening based on your age, risk, and preferences. This might include tests to check for:   Cancer, such as breast, prostate, cervical, ovarian, colorectal, prostate, lung, or skin cancer   Sexually transmitted infections, such as chlamydia and gonorrhea   Mental health conditions like depression and anxiety  Your doctor will talk to you about the different types of screening tests. They can help you decide which screenings to have. They can also explain what the results might mean.   Answering questions - The physical is a good time to ask the doctor or nurse questions about your health. If needed, they can refer you to other doctors or specialists, too.  Adults older than 65 years often need other care, too. As you get older, your doctor will talk to you about:   How to prevent falling at " home   Hearing or vision tests   Memory testing   How to take your medicines safely   Making sure that you have the help and support you need at home  All topics are updated as new evidence becomes available and our peer review process is complete.  This topic retrieved from [employer] on: May 02, 2024.  Topic 007300 Version 1.0  Release: 32.4.3 - C32.122  © 2024 UpToDate, Inc. and/or its affiliates. All rights reserved.  Consumer Information Use and Disclaimer   Disclaimer: This generalized information is a limited summary of diagnosis, treatment, and/or medication information. It is not meant to be comprehensive and should be used as a tool to help the user understand and/or assess potential diagnostic and treatment options. It does NOT include all information about conditions, treatments, medications, side effects, or risks that may apply to a specific patient. It is not intended to be medical advice or a substitute for the medical advice, diagnosis, or treatment of a health care provider based on the health care provider's examination and assessment of a patient's specific and unique circumstances. Patients must speak with a health care provider for complete information about their health, medical questions, and treatment options, including any risks or benefits regarding use of medications. This information does not endorse any treatments or medications as safe, effective, or approved for treating a specific patient. UpToDate, Inc. and its affiliates disclaim any warranty or liability relating to this information or the use thereof.The use of this information is governed by the Terms of Use, available at https://www.[employer]uwer.com/en/know/clinical-effectiveness-terms. 2024© UpToDate, Inc. and its affiliates and/or licensors. All rights reserved.  Copyright   © 2024 UpToDate, Inc. and/or its affiliates. All rights reserved.

## 2025-04-29 NOTE — ASSESSMENT & PLAN NOTE
Orders:    Vitamin D 25 hydroxy; Future    Cholecalciferol (Vitamin D3) 125 MCG (5000 UT) CAPS; Take 1 capsule (5,000 Units total) by mouth in the morning

## 2025-04-29 NOTE — PROGRESS NOTES
Adult Annual Physical  Name: Minor Pearson      : 1960      MRN: [id number]  Encounter Provider: ABHI Power  Encounter Date: 2025   Encounter department: [facility]    :  Assessment & Plan  Encounter for immunization         Annual physical exam    Orders:    CBC and differential; Future    Comprehensive metabolic panel; Future    Vitamin D deficiency    Orders:    Vitamin D 25 hydroxy; Future    Cholecalciferol (Vitamin D3) 125 MCG (5000 UT) CAPS; Take 1 capsule (5,000 Units total) by mouth in the morning    Impaired fasting glucose    Orders:    Hemoglobin A1C; Future    On long term drug therapy    Orders:    Hemoglobin A1C; Future    Lipid Panel with Direct LDL reflex; Future    Vitamin D 25 hydroxy; Future    Pure hypercholesterolemia    Orders:    Lipid Panel with Direct LDL reflex; Future    Screening for malignant neoplasm of prostate    Orders:    PSA, Total Screen; Future    Encounter for immunization         Annual physical exam         Vitamin D deficiency         Impaired fasting glucose         On long term drug therapy         Pure hypercholesterolemia         Screening for malignant neoplasm of prostate               Preventive Screenings:  - Diabetes Screening: screening up-to-date  - Cholesterol Screening: screening not indicated and has hyperlipidemia   - Hepatitis C screening: screening up-to-date   - Colon cancer screening: screening up-to-date   - Lung cancer screening: screening not indicated     Counseling/Anticipatory Guidance:  - Alcohol: discussed moderation in alcohol intake and recommendations for healthy alcohol use.   - Drug use: discussed harms of illicit drug use and how it can negatively impact mental/physical health.   - Tobacco use: discussed harms of tobacco use and management options for quitting.   - Dental health: discussed importance of regular tooth brushing, flossing, and dental visits.   - Sexual health: discussed  sexually transmitted diseases, partner selection, use of condoms, avoidance of unintended pregnancy, and contraceptive alternatives.   - Diet: discussed recommendations for a healthy/well-balanced diet.   - Exercise: the importance of regular exercise/physical activity was discussed. Recommend exercise 3-5 times per week for at least 30 minutes.   - Injury prevention: discussed safety/seat belts, safety helmets, smoke detectors, carbon monoxide detectors, and smoking near bedding or upholstery.       Depression Screening and Follow-up Plan: Patient was screened for depression during today's encounter. They screened negative with a PHQ-2 score of 0.          History of Present Illness     Adult Annual Physical:  Patient presents for annual physical.     Diet and Physical Activity:  - Diet/Nutrition: well balanced diet and portion control.  - Exercise: moderate cardiovascular exercise and less than 30 minutes on average.    Depression Screening:  - PHQ-2 Score: 0    General Health:  - Sleep: sleeps well and 7-8 hours of sleep on average.  - Hearing: normal hearing right ear and normal hearing left ear.  - Vision: most recent eye exam > 1 year ago and wears glasses.  - Dental: no dental visits for > 1 year and brushes teeth once daily.    /GYN Health:  - Follows with GYN: no.   - History of STDs: no     Health:  - History of STDs: no.   - Urinary symptoms: erectile dysfunction.     Advanced Care Planning:  - Has an advanced directive?: no    - Has a durable medical POA?: no    - ACP document given to patient?: no      Review of Systems   Constitutional:  Negative for activity change, chills, fatigue and fever.   HENT:  Negative for rhinorrhea and sore throat.    Eyes:  Negative for pain.   Respiratory:  Negative for cough and shortness of breath.    Cardiovascular:  Negative for chest pain, palpitations and leg swelling.   Gastrointestinal:  Negative for abdominal pain, constipation, diarrhea, nausea and vomiting.  "  Genitourinary:  Negative for difficulty urinating, flank pain, frequency and urgency.   Musculoskeletal:  Negative for gait problem, joint swelling and myalgias.   Skin:  Negative for color change.   Neurological:  Negative for dizziness, weakness, light-headedness and headaches.   Psychiatric/Behavioral:  Negative for sleep disturbance. The patient is not nervous/anxious.    All other systems reviewed and are negative.    Current Outpatient Medications on File Prior to Visit   Medication Sig Dispense Refill    fluticasone (FLONASE) 50 mcg/act nasal spray 1 spray into each nostril daily 16 g 3    Semaglutide-Weight Management (WEGOVY) 1.7 MG/0.75ML Inject 0.75 mL (1.7 mg total) under the skin once a week 3 mL 1    sildenafil (VIAGRA) 50 MG tablet Take 1 tablet (50 mg total) by mouth daily as needed for erectile dysfunction 10 tablet 0     No current facility-administered medications on file prior to visit.        Objective   /70 (BP Location: Left arm, Patient Position: Sitting, Cuff Size: Large)   Pulse 80   Temp 97.7 °F (36.5 °C) (Tympanic)   Resp 16   Ht 6' 1\" (1.854 m)   Wt 130 kg (287 lb 9.6 oz)   SpO2 97%   BMI 37.94 kg/m²     Physical Exam  Vitals and nursing note reviewed.   Constitutional:       General: He is awake.      Appearance: Normal appearance. He is well-developed.   HENT:      Head: Normocephalic and atraumatic.      Nose: Nose normal.      Mouth/Throat:      Mouth: Mucous membranes are moist.   Eyes:      Conjunctiva/sclera: Conjunctivae normal.   Cardiovascular:      Rate and Rhythm: Normal rate and regular rhythm.      Pulses: Normal pulses.      Heart sounds: Normal heart sounds. No murmur heard.  Pulmonary:      Effort: Pulmonary effort is normal. No respiratory distress.      Breath sounds: Normal breath sounds.   Abdominal:      General: Bowel sounds are normal.      Palpations: Abdomen is soft.      Tenderness: There is no abdominal tenderness.   Musculoskeletal:      " Cervical back: Neck supple.      Right lower leg: No edema.      Left lower leg: No edema.   Skin:     General: Skin is warm and dry.   Neurological:      Mental Status: He is alert and oriented to person, place, and time.      Motor: Motor function is intact.      Coordination: Coordination is intact.      Gait: Gait is intact.   Psychiatric:         Attention and Perception: Attention normal.         Mood and Affect: Mood normal.         Speech: Speech normal.         Behavior: Behavior normal. Behavior is cooperative.         Thought Content: Thought content normal.         Cognition and Memory: Cognition normal.         Judgment: Judgment normal.       Administrative Statements   I have spent a total time of 40 minutes in caring for this patient on the day of the visit/encounter including Diagnostic results, Prognosis, Risks and benefits of tx options, Instructions for management, Patient and family education, Importance of tx compliance, Risk factor reductions, Impressions, Counseling / Coordination of care, Documenting in the medical record, Reviewing/placing orders in the medical record (including tests, medications, and/or procedures), and Obtaining or reviewing history  .

## 2025-05-16 DIAGNOSIS — E66.812 OBESITY, CLASS II, BMI 35-39.9: ICD-10-CM

## 2025-05-16 DIAGNOSIS — E66.812 OBESITY, CLASS II, BMI 35-39.9: Primary | ICD-10-CM

## 2025-05-16 RX ORDER — SEMAGLUTIDE 1.7 MG/.75ML
INJECTION, SOLUTION SUBCUTANEOUS
Refills: 1 | OUTPATIENT
Start: 2025-05-16

## 2025-05-19 DIAGNOSIS — E66.812 OBESITY, CLASS II, BMI 35-39.9: ICD-10-CM

## 2025-05-19 RX ORDER — SEMAGLUTIDE 1.7 MG/.75ML
INJECTION, SOLUTION SUBCUTANEOUS
Qty: 3 ML | Refills: 0 | Status: SHIPPED | OUTPATIENT
Start: 2025-05-19

## 2025-05-29 ENCOUNTER — OFFICE VISIT (OUTPATIENT)
Dept: BARIATRICS | Facility: CLINIC | Age: 65
End: 2025-05-29
Payer: COMMERCIAL

## 2025-05-29 VITALS
BODY MASS INDEX: 37.46 KG/M2 | TEMPERATURE: 97.5 F | WEIGHT: 282.6 LBS | SYSTOLIC BLOOD PRESSURE: 120 MMHG | RESPIRATION RATE: 18 BRPM | HEART RATE: 87 BPM | OXYGEN SATURATION: 95 % | DIASTOLIC BLOOD PRESSURE: 72 MMHG | HEIGHT: 73 IN

## 2025-05-29 DIAGNOSIS — R73.01 IMPAIRED FASTING GLUCOSE: ICD-10-CM

## 2025-05-29 DIAGNOSIS — E66.812 OBESITY, CLASS II, BMI 35-39.9: Primary | ICD-10-CM

## 2025-05-29 PROBLEM — Z12.5 SCREENING FOR MALIGNANT NEOPLASM OF PROSTATE: Status: RESOLVED | Noted: 2025-04-29 | Resolved: 2025-05-29

## 2025-05-29 PROCEDURE — 99214 OFFICE O/P EST MOD 30 MIN: CPT | Performed by: PHYSICIAN ASSISTANT

## 2025-05-29 RX ORDER — SEMAGLUTIDE 2.4 MG/.75ML
2.4 INJECTION, SOLUTION SUBCUTANEOUS WEEKLY
Qty: 9 ML | Refills: 0 | Status: SHIPPED | OUTPATIENT
Start: 2025-05-29 | End: 2025-08-27

## 2025-05-29 NOTE — ASSESSMENT & PLAN NOTE
- Patient is pursuing Conservative Program  - Screening labs: Reviewed today with patient, all look good  - Calorie goal: 0687-5778  - Doing well with weight loss, lost 34 lbs since consult in December  - Continue Wegovy, increase to 2.4mg as patient states he hit plateau last few weeks, increase fiber to avoid constipation, sent 90 day supply  -Stay within calorie goals, increase protein, decrease fluid calories, increase steps per day to 8k minimum, patient going to start working out on home gym  - 5/8 stop bang, patient not interested in sleep study at this time, will reconsider in future, will improve with weight loss  - Medication agreement signed: Yes, at consult  - Follow up in 3 months  - Dietician: Not interested at this time

## 2025-05-29 NOTE — PROGRESS NOTES
Assessment/Plan:    Obesity, Class II, BMI 35-39.9  - Patient is pursuing Conservative Program  - Screening labs: Reviewed today with patient, all look good  - Calorie goal: 7961-5700  - Doing well with weight loss, lost 34 lbs since consult in December  - Continue Wegovy, increase to 2.4mg as patient states he hit plateau last few weeks, increase fiber to avoid constipation, sent 90 day supply  -Stay within calorie goals, increase protein, decrease fluid calories, increase steps per day to 8k minimum, patient going to start working out on home gym  - 5/8 stop bang, patient not interested in sleep study at this time, will reconsider in future, will improve with weight loss  - Medication agreement signed: Yes, at consult  - Follow up in 3 months  - Dietician: Not interested at this time          Impaired fasting glucose  - labs reviewed  - improved        Goals:    Food log (ie.) [email or web address],etc.   No sugary beverages. At least 64oz of water daily.  Increase physical activity by 10 minutes daily. Gradually increase physical activity to a goal of 5 days per week for 30 minutes of MODERATE intensity PLUS 2 days per week of FULL BODY resistance training    Calorie goal: 0179-1266  Food log (ie.) [email or web address],etc.   No sugary beverages. At least 64oz of water daily.  Goal protein intake of 60-80 grams per day  25-35 grams of dietary fiber per day    Follow up in approximately 3 months with Surgical Advanced Practitioner.     Diagnoses and all orders for this visit:    Obesity, Class II, BMI 35-39.9  -     Semaglutide-Weight Management (Wegovy) 2.4 MG/0.75ML; Inject 0.75 mL (2.4 mg total) under the skin once a week    Impaired fasting glucose          Subjective:   Chief Complaint   Patient presents with    Follow-up     3 month follow up         Patient ID: Minor Pearson  is a 65 y.o. male with excess weight/obesity  here to pursue weight managment.  Patient is pursuing Conservative Program.     HPI    Weight at last visit on 2/27: 300  Current weight: 282.6  Change: - 17.4lbs  Overall change since consult on 12/19:  -34.4 lbs    Patient on Wegovy 1.7mg doing well. Mild constipation. Notes hitting plateau over the last few weeks. Just got a home gym and going to start weight training.    Wt Readings from Last 10 Encounters:   05/29/25 128 kg (282 lb 9.6 oz)   04/29/25 130 kg (287 lb 9.6 oz)   02/27/25 (!) 136 kg (300 lb 3.2 oz)   12/19/24 (!) 144 kg (317 lb)   10/25/24 (!) 143 kg (316 lb)   05/17/24 (!) 141 kg (310 lb 13.6 oz)   05/08/24 (!) 143 kg (316 lb)   04/22/24 (!) 144 kg (316 lb 9.6 oz)   03/20/24 (!) 144 kg (317 lb 3.2 oz)      Food logging:  B: 2 eggs + 2 wheat toast + scrapple or cereal + 2 cups coffee + half and half  S: skip  L: soup + crackers or turkey + cheese on wheat bread with rogers  S: bologna + cheese  D: fish or wings or chicken parm + beans + brussel sprouts  S: skips     Hunger/Cravings: salty  Dining out: Once a week  Hydration: water 64 ounces  Alcohol: 1-2 beer per day (down from 3-6 per day)  Smoking: Denies  Exercise: Works around the yard, has a dog he plays with, has stationary bike  Weight Monitoring: Once a week  Sleep: 9 hours  STOP-BANG Score: 5/8, recommended sleep medicine for sleep study   Occupation: Retired  Contraception: N/A  Colonoscopy: 5/17/24 3 year recall         The following portions of the patient's history were reviewed and updated as appropriate: allergies, current medications, past family history, past medical history, past social history, past surgical history, and problem list.    Review of Systems   Constitutional:  Negative for chills and fever.   HENT:  Negative for ear pain and sore throat.    Eyes:  Negative for pain and visual disturbance.   Respiratory:  Negative for cough and shortness of breath.    Cardiovascular:  Negative for chest pain and palpitations.  "  Gastrointestinal:  Negative for abdominal pain and vomiting.   Genitourinary:  Negative for dysuria and hematuria.   Musculoskeletal:  Negative for arthralgias and back pain.   Skin:  Negative for color change and rash.   Neurological:  Negative for seizures and syncope.   All other systems reviewed and are negative.      Objective:    /72 (BP Location: Right arm, Patient Position: Sitting, Cuff Size: Large)   Pulse 87   Temp 97.5 °F (36.4 °C) (Temporal)   Resp 18   Ht 6' 1\" (1.854 m)   Wt 128 kg (282 lb 9.6 oz)   SpO2 95%   BMI 37.28 kg/m²      Physical Exam  Constitutional:       Appearance: Normal appearance. He is obese.   HENT:      Head: Normocephalic and atraumatic.      Nose: Nose normal.      Mouth/Throat:      Mouth: Mucous membranes are moist.     Eyes:      Extraocular Movements: Extraocular movements intact.      Pupils: Pupils are equal, round, and reactive to light.       Cardiovascular:      Rate and Rhythm: Normal rate and regular rhythm.      Pulses: Normal pulses.      Heart sounds: Normal heart sounds.   Pulmonary:      Effort: Pulmonary effort is normal.      Breath sounds: Normal breath sounds.   Abdominal:      Palpations: Abdomen is soft.     Musculoskeletal:      Cervical back: Normal range of motion.     Skin:     General: Skin is warm.     Neurological:      General: No focal deficit present.      Mental Status: He is alert and oriented to person, place, and time.     Psychiatric:         Mood and Affect: Mood normal.         Behavior: Behavior normal.        "

## 2025-07-11 ENCOUNTER — TELEPHONE (OUTPATIENT)
Dept: BARIATRICS | Facility: CLINIC | Age: 65
End: 2025-07-11

## 2025-07-25 DIAGNOSIS — J30.89 ENVIRONMENTAL AND SEASONAL ALLERGIES: ICD-10-CM

## 2025-07-25 RX ORDER — FLUTICASONE PROPIONATE 50 MCG
SPRAY, SUSPENSION (ML) NASAL
Qty: 48 ML | Refills: 1 | Status: SHIPPED | OUTPATIENT
Start: 2025-07-25

## 2025-07-27 DIAGNOSIS — N52.1 ERECTILE DYSFUNCTION DUE TO DISEASES CLASSIFIED ELSEWHERE: Chronic | ICD-10-CM

## 2025-07-28 RX ORDER — SILDENAFIL 50 MG/1
50 TABLET, FILM COATED ORAL DAILY PRN
Qty: 10 TABLET | Refills: 0 | Status: SHIPPED | OUTPATIENT
Start: 2025-07-28